# Patient Record
Sex: MALE | Race: OTHER | NOT HISPANIC OR LATINO | URBAN - METROPOLITAN AREA
[De-identification: names, ages, dates, MRNs, and addresses within clinical notes are randomized per-mention and may not be internally consistent; named-entity substitution may affect disease eponyms.]

---

## 2021-05-04 PROBLEM — Z00.00 ENCOUNTER FOR PREVENTIVE HEALTH EXAMINATION: Status: ACTIVE | Noted: 2021-05-04

## 2021-05-05 ENCOUNTER — OUTPATIENT (OUTPATIENT)
Dept: OUTPATIENT SERVICES | Facility: HOSPITAL | Age: 71
LOS: 1 days | Discharge: HOME | End: 2021-05-05
Payer: MEDICARE

## 2021-05-06 ENCOUNTER — OUTPATIENT (OUTPATIENT)
Dept: OUTPATIENT SERVICES | Facility: HOSPITAL | Age: 71
LOS: 1 days | Discharge: HOME | End: 2021-05-06
Payer: MEDICARE

## 2021-05-06 DIAGNOSIS — C09.9 MALIGNANT NEOPLASM OF TONSIL, UNSPECIFIED: ICD-10-CM

## 2021-05-07 ENCOUNTER — NON-APPOINTMENT (OUTPATIENT)
Age: 71
End: 2021-05-07

## 2021-05-07 ENCOUNTER — RESULT REVIEW (OUTPATIENT)
Age: 71
End: 2021-05-07

## 2021-05-07 ENCOUNTER — APPOINTMENT (OUTPATIENT)
Dept: HEMATOLOGY ONCOLOGY | Facility: CLINIC | Age: 71
End: 2021-05-07
Payer: MEDICARE

## 2021-05-07 ENCOUNTER — APPOINTMENT (OUTPATIENT)
Dept: RADIATION ONCOLOGY | Facility: CLINIC | Age: 71
End: 2021-05-07
Payer: MEDICARE

## 2021-05-07 VITALS
HEIGHT: 72 IN | OXYGEN SATURATION: 97 % | SYSTOLIC BLOOD PRESSURE: 136 MMHG | WEIGHT: 170.86 LBS | TEMPERATURE: 97.3 F | BODY MASS INDEX: 23.14 KG/M2 | RESPIRATION RATE: 16 BRPM | DIASTOLIC BLOOD PRESSURE: 85 MMHG | HEART RATE: 81 BPM

## 2021-05-07 VITALS
BODY MASS INDEX: 23.14 KG/M2 | OXYGEN SATURATION: 98 % | HEART RATE: 72 BPM | RESPIRATION RATE: 16 BRPM | HEIGHT: 72 IN | TEMPERATURE: 97.9 F | DIASTOLIC BLOOD PRESSURE: 93 MMHG | SYSTOLIC BLOOD PRESSURE: 159 MMHG | WEIGHT: 170.86 LBS

## 2021-05-07 DIAGNOSIS — Z78.9 OTHER SPECIFIED HEALTH STATUS: ICD-10-CM

## 2021-05-07 DIAGNOSIS — Z83.3 FAMILY HISTORY OF DIABETES MELLITUS: ICD-10-CM

## 2021-05-07 LAB
BASOPHILS # BLD AUTO: 0.02 K/UL — SIGNIFICANT CHANGE UP (ref 0–0.2)
BASOPHILS NFR BLD AUTO: 0.3 % — SIGNIFICANT CHANGE UP (ref 0–2)
EOSINOPHIL # BLD AUTO: 0.08 K/UL — SIGNIFICANT CHANGE UP (ref 0–0.5)
EOSINOPHIL NFR BLD AUTO: 1.4 % — SIGNIFICANT CHANGE UP (ref 0–6)
HCT VFR BLD CALC: 48.7 % — SIGNIFICANT CHANGE UP (ref 39–50)
HGB BLD-MCNC: 15.6 G/DL — SIGNIFICANT CHANGE UP (ref 13–17)
IMM GRANULOCYTES NFR BLD AUTO: 0.3 % — SIGNIFICANT CHANGE UP (ref 0–1.5)
LYMPHOCYTES # BLD AUTO: 1.28 K/UL — SIGNIFICANT CHANGE UP (ref 1–3.3)
LYMPHOCYTES # BLD AUTO: 22.1 % — SIGNIFICANT CHANGE UP (ref 13–44)
MCHC RBC-ENTMCNC: 30.5 PG — SIGNIFICANT CHANGE UP (ref 27–34)
MCHC RBC-ENTMCNC: 32 G/DL — SIGNIFICANT CHANGE UP (ref 32–36)
MCV RBC AUTO: 95.1 FL — SIGNIFICANT CHANGE UP (ref 80–100)
MONOCYTES # BLD AUTO: 0.43 K/UL — SIGNIFICANT CHANGE UP (ref 0–0.9)
MONOCYTES NFR BLD AUTO: 7.4 % — SIGNIFICANT CHANGE UP (ref 2–14)
NEUTROPHILS # BLD AUTO: 3.96 K/UL — SIGNIFICANT CHANGE UP (ref 1.8–7.4)
NEUTROPHILS NFR BLD AUTO: 68.5 % — SIGNIFICANT CHANGE UP (ref 43–77)
NRBC # BLD: 0 /100 WBCS — SIGNIFICANT CHANGE UP (ref 0–0)
PLATELET # BLD AUTO: 241 K/UL — SIGNIFICANT CHANGE UP (ref 150–400)
RBC # BLD: 5.12 M/UL — SIGNIFICANT CHANGE UP (ref 4.2–5.8)
RBC # FLD: 12.5 % — SIGNIFICANT CHANGE UP (ref 10.3–14.5)
WBC # BLD: 5.79 K/UL — SIGNIFICANT CHANGE UP (ref 3.8–10.5)
WBC # FLD AUTO: 5.79 K/UL — SIGNIFICANT CHANGE UP (ref 3.8–10.5)

## 2021-05-07 PROCEDURE — 99072 ADDL SUPL MATRL&STAF TM PHE: CPT

## 2021-05-07 PROCEDURE — 99204 OFFICE O/P NEW MOD 45 MIN: CPT | Mod: 25

## 2021-05-07 PROCEDURE — 99205 OFFICE O/P NEW HI 60 MIN: CPT

## 2021-05-07 PROCEDURE — 77263 THER RADIOLOGY TX PLNG CPLX: CPT

## 2021-05-07 NOTE — VITALS
[Maximal Pain Intensity: 3/10] : 3/10 [Least Pain Intensity: 0/10] : 0/10 [Pain Description/Quality: ___] : Pain description/quality: [unfilled] [Pain Duration: ___] : Pain duration: [unfilled] [Pain Location: ___] : Pain Location: [unfilled] [NoTreatment Scheduled] : no treatment scheduled [90: Able to carry normal activity; minor signs or symptoms of disease.] : 90: Able to carry normal activity; minor signs or symptoms of disease.  [ECOG Performance Status: 1 - Restricted in physically strenuous activity but ambulatory and able to carry out work of a light or sedentary nature] : Performance Status: 1 - Restricted in physically strenuous activity but ambulatory and able to carry out work of a light or sedentary nature, e.g., light house work, office work

## 2021-05-10 LAB
ALBUMIN SERPL ELPH-MCNC: 4.8 G/DL
ALP BLD-CCNC: 53 U/L
ALT SERPL-CCNC: 15 U/L
ANION GAP SERPL CALC-SCNC: 17 MMOL/L
AST SERPL-CCNC: 19 U/L
BILIRUB SERPL-MCNC: 0.6 MG/DL
BUN SERPL-MCNC: 11 MG/DL
CALCIUM SERPL-MCNC: 9.9 MG/DL
CHLORIDE SERPL-SCNC: 105 MMOL/L
CO2 SERPL-SCNC: 21 MMOL/L
CREAT SERPL-MCNC: 0.96 MG/DL
GLUCOSE SERPL-MCNC: 84 MG/DL
HBV CORE IGG+IGM SER QL: REACTIVE
HBV SURFACE AB SER QL: REACTIVE
HBV SURFACE AG SER QL: NONREACTIVE
HCV AB SER QL: NONREACTIVE
HCV S/CO RATIO: 0.94 S/CO
LDH SERPL-CCNC: 153 U/L
POTASSIUM SERPL-SCNC: 4.3 MMOL/L
PROT SERPL-MCNC: 7.6 G/DL
SODIUM SERPL-SCNC: 143 MMOL/L

## 2021-05-10 NOTE — REVIEW OF SYSTEMS
[Negative] : Allergic/Immunologic [FreeTextEntry4] : left tonsil mass [FreeTextEntry8] : h/o right kidney surgery

## 2021-05-10 NOTE — PHYSICAL EXAM
[Normal] : oriented to person, place and time, the affect was normal, the mood was normal and not anxious [de-identified] : L tonsil/BOT lesion

## 2021-05-10 NOTE — HISTORY OF PRESENT ILLNESS
[FreeTextEntry1] : 71 y/o male without significant PMH, newly diagnosed left tonsil SCC presents to discuss radiation treatment. \par \par Initially he presented with left neck pain or discomfort when swallowing. No headache, dizziness, SOB, cough. \par \par Pathology in 3/2021 from Dr Sands (phone [phone]) showed left tonsil SCC. 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 Will order PET scan\par \par Side effects of nasopharyngeal and cervical radiation were reviewed and include but are not limited to mucositis, xerostomia, dysgeusia, odynophagia, dysphagia, radiation dermatitis, radiation necrosis, feeding tube placement, feeding tube dependency, hypothyroidism, and secondary malignancy.

## 2021-05-12 ENCOUNTER — RESULT REVIEW (OUTPATIENT)
Age: 71
End: 2021-05-12

## 2021-05-12 PROCEDURE — 88321 CONSLTJ&REPRT SLD PREP ELSWR: CPT

## 2021-05-13 ENCOUNTER — RESULT REVIEW (OUTPATIENT)
Age: 71
End: 2021-05-13

## 2021-05-13 PROCEDURE — 88321 CONSLTJ&REPRT SLD PREP ELSWR: CPT

## 2021-05-14 LAB
NON-GYNECOLOGICAL CYTOLOGY STUDY: SIGNIFICANT CHANGE UP
SURGICAL PATHOLOGY STUDY: SIGNIFICANT CHANGE UP

## 2021-05-21 ENCOUNTER — NON-APPOINTMENT (OUTPATIENT)
Age: 71
End: 2021-05-21

## 2021-05-24 ENCOUNTER — RESULT REVIEW (OUTPATIENT)
Age: 71
End: 2021-05-24

## 2021-05-27 ENCOUNTER — APPOINTMENT (OUTPATIENT)
Dept: NUCLEAR MEDICINE | Facility: IMAGING CENTER | Age: 71
End: 2021-05-27
Payer: MEDICARE

## 2021-05-27 ENCOUNTER — OUTPATIENT (OUTPATIENT)
Dept: OUTPATIENT SERVICES | Facility: HOSPITAL | Age: 71
LOS: 1 days | End: 2021-05-27
Payer: MEDICARE

## 2021-05-27 DIAGNOSIS — C09.8 MALIGNANT NEOPLASM OF OVERLAPPING SITES OF TONSIL: ICD-10-CM

## 2021-05-27 PROCEDURE — A9552: CPT

## 2021-05-27 PROCEDURE — 78815 PET IMAGE W/CT SKULL-THIGH: CPT

## 2021-05-27 PROCEDURE — 78815 PET IMAGE W/CT SKULL-THIGH: CPT | Mod: 26,PI

## 2021-06-08 ENCOUNTER — APPOINTMENT (OUTPATIENT)
Dept: SPEECH THERAPY | Facility: CLINIC | Age: 71
End: 2021-06-08
Payer: MEDICARE

## 2021-06-08 ENCOUNTER — TRANSCRIPTION ENCOUNTER (OUTPATIENT)
Age: 71
End: 2021-06-08

## 2021-06-08 PROCEDURE — 92610 EVALUATE SWALLOWING FUNCTION: CPT | Mod: GN

## 2021-06-11 ENCOUNTER — NON-APPOINTMENT (OUTPATIENT)
Age: 71
End: 2021-06-11

## 2021-06-11 PROCEDURE — 77338 DESIGN MLC DEVICE FOR IMRT: CPT | Mod: 26

## 2021-06-11 PROCEDURE — 77300 RADIATION THERAPY DOSE PLAN: CPT | Mod: 26

## 2021-06-11 PROCEDURE — 77301 RADIOTHERAPY DOSE PLAN IMRT: CPT | Mod: 26

## 2021-06-15 ENCOUNTER — OUTPATIENT (OUTPATIENT)
Dept: OUTPATIENT SERVICES | Facility: HOSPITAL | Age: 71
LOS: 1 days | Discharge: HOME | End: 2021-06-15

## 2021-06-15 ENCOUNTER — NON-APPOINTMENT (OUTPATIENT)
Age: 71
End: 2021-06-15

## 2021-06-15 DIAGNOSIS — C09.9 MALIGNANT NEOPLASM OF TONSIL, UNSPECIFIED: ICD-10-CM

## 2021-06-16 ENCOUNTER — APPOINTMENT (OUTPATIENT)
Dept: HEMATOLOGY ONCOLOGY | Facility: CLINIC | Age: 71
End: 2021-06-16

## 2021-06-17 ENCOUNTER — NON-APPOINTMENT (OUTPATIENT)
Age: 71
End: 2021-06-17

## 2021-06-18 ENCOUNTER — TRANSCRIPTION ENCOUNTER (OUTPATIENT)
Age: 71
End: 2021-06-18

## 2021-06-20 ENCOUNTER — NON-APPOINTMENT (OUTPATIENT)
Age: 71
End: 2021-06-20

## 2021-06-22 ENCOUNTER — NON-APPOINTMENT (OUTPATIENT)
Age: 71
End: 2021-06-22

## 2021-06-23 ENCOUNTER — NON-APPOINTMENT (OUTPATIENT)
Age: 71
End: 2021-06-23

## 2021-06-25 ENCOUNTER — LABORATORY RESULT (OUTPATIENT)
Age: 71
End: 2021-06-25

## 2021-06-25 ENCOUNTER — RESULT REVIEW (OUTPATIENT)
Age: 71
End: 2021-06-25

## 2021-06-25 ENCOUNTER — APPOINTMENT (OUTPATIENT)
Dept: INFUSION THERAPY | Facility: HOSPITAL | Age: 71
End: 2021-06-25

## 2021-06-25 ENCOUNTER — APPOINTMENT (OUTPATIENT)
Dept: HEMATOLOGY ONCOLOGY | Facility: CLINIC | Age: 71
End: 2021-06-25
Payer: MEDICARE

## 2021-06-25 LAB
BASOPHILS # BLD AUTO: 0.04 K/UL — SIGNIFICANT CHANGE UP (ref 0–0.2)
BASOPHILS NFR BLD AUTO: 0.7 % — SIGNIFICANT CHANGE UP (ref 0–2)
EOSINOPHIL # BLD AUTO: 0.08 K/UL — SIGNIFICANT CHANGE UP (ref 0–0.5)
EOSINOPHIL NFR BLD AUTO: 1.3 % — SIGNIFICANT CHANGE UP (ref 0–6)
HCT VFR BLD CALC: 44.2 % — SIGNIFICANT CHANGE UP (ref 39–50)
HGB BLD-MCNC: 14.6 G/DL — SIGNIFICANT CHANGE UP (ref 13–17)
IMM GRANULOCYTES NFR BLD AUTO: 1.3 % — SIGNIFICANT CHANGE UP (ref 0–1.5)
LYMPHOCYTES # BLD AUTO: 1.2 K/UL — SIGNIFICANT CHANGE UP (ref 1–3.3)
LYMPHOCYTES # BLD AUTO: 20.1 % — SIGNIFICANT CHANGE UP (ref 13–44)
MCHC RBC-ENTMCNC: 31.1 PG — SIGNIFICANT CHANGE UP (ref 27–34)
MCHC RBC-ENTMCNC: 33 G/DL — SIGNIFICANT CHANGE UP (ref 32–36)
MCV RBC AUTO: 94.2 FL — SIGNIFICANT CHANGE UP (ref 80–100)
MONOCYTES # BLD AUTO: 0.43 K/UL — SIGNIFICANT CHANGE UP (ref 0–0.9)
MONOCYTES NFR BLD AUTO: 7.2 % — SIGNIFICANT CHANGE UP (ref 2–14)
NEUTROPHILS # BLD AUTO: 4.15 K/UL — SIGNIFICANT CHANGE UP (ref 1.8–7.4)
NEUTROPHILS NFR BLD AUTO: 69.4 % — SIGNIFICANT CHANGE UP (ref 43–77)
NRBC # BLD: 0 /100 WBCS — SIGNIFICANT CHANGE UP (ref 0–0)
PLATELET # BLD AUTO: 253 K/UL — SIGNIFICANT CHANGE UP (ref 150–400)
RBC # BLD: 4.69 M/UL — SIGNIFICANT CHANGE UP (ref 4.2–5.8)
RBC # FLD: 12.6 % — SIGNIFICANT CHANGE UP (ref 10.3–14.5)
WBC # BLD: 5.98 K/UL — SIGNIFICANT CHANGE UP (ref 3.8–10.5)
WBC # FLD AUTO: 5.98 K/UL — SIGNIFICANT CHANGE UP (ref 3.8–10.5)

## 2021-06-25 PROCEDURE — 99214 OFFICE O/P EST MOD 30 MIN: CPT

## 2021-06-25 PROCEDURE — 77387B: CUSTOM | Mod: 26

## 2021-06-27 DIAGNOSIS — R11.2 NAUSEA WITH VOMITING, UNSPECIFIED: ICD-10-CM

## 2021-06-27 DIAGNOSIS — E86.0 DEHYDRATION: ICD-10-CM

## 2021-06-27 DIAGNOSIS — Z51.11 ENCOUNTER FOR ANTINEOPLASTIC CHEMOTHERAPY: ICD-10-CM

## 2021-06-28 ENCOUNTER — APPOINTMENT (OUTPATIENT)
Dept: INFUSION THERAPY | Facility: HOSPITAL | Age: 71
End: 2021-06-28

## 2021-06-28 ENCOUNTER — LABORATORY RESULT (OUTPATIENT)
Age: 71
End: 2021-06-28

## 2021-06-28 ENCOUNTER — NON-APPOINTMENT (OUTPATIENT)
Age: 71
End: 2021-06-28

## 2021-06-28 PROCEDURE — 77014: CPT | Mod: 26

## 2021-06-29 PROCEDURE — 77387B: CUSTOM | Mod: 26

## 2021-06-30 ENCOUNTER — NON-APPOINTMENT (OUTPATIENT)
Age: 71
End: 2021-06-30

## 2021-06-30 VITALS — WEIGHT: 158 LBS | HEIGHT: 72 IN | RESPIRATION RATE: 18 BRPM | BODY MASS INDEX: 21.4 KG/M2

## 2021-06-30 PROCEDURE — 77387B: CUSTOM | Mod: 26

## 2021-06-30 NOTE — PHYSICAL EXAM
[Normal] : well developed, well nourished, in no acute distress [Sclera] : the sclera and conjunctiva were normal

## 2021-07-01 PROBLEM — C10.9 MALIGNANT NEOPLASM OF OROPHARYNX, UNSPECIFIED: Chronic | Status: ACTIVE | Noted: 2021-06-15

## 2021-07-01 PROCEDURE — 77427 RADIATION TX MANAGEMENT X5: CPT

## 2021-07-01 PROCEDURE — 77387B: CUSTOM | Mod: 26

## 2021-07-02 ENCOUNTER — RESULT REVIEW (OUTPATIENT)
Age: 71
End: 2021-07-02

## 2021-07-02 ENCOUNTER — APPOINTMENT (OUTPATIENT)
Dept: HEMATOLOGY ONCOLOGY | Facility: CLINIC | Age: 71
End: 2021-07-02
Payer: MEDICARE

## 2021-07-02 ENCOUNTER — APPOINTMENT (OUTPATIENT)
Dept: INFUSION THERAPY | Facility: HOSPITAL | Age: 71
End: 2021-07-02

## 2021-07-02 VITALS
BODY MASS INDEX: 20.96 KG/M2 | DIASTOLIC BLOOD PRESSURE: 76 MMHG | RESPIRATION RATE: 18 BRPM | HEIGHT: 72.01 IN | SYSTOLIC BLOOD PRESSURE: 106 MMHG | HEART RATE: 81 BPM | TEMPERATURE: 97.9 F | OXYGEN SATURATION: 98 % | WEIGHT: 154.74 LBS

## 2021-07-02 LAB
BASOPHILS # BLD AUTO: 0.01 K/UL — SIGNIFICANT CHANGE UP (ref 0–0.2)
BASOPHILS NFR BLD AUTO: 0.2 % — SIGNIFICANT CHANGE UP (ref 0–2)
EOSINOPHIL # BLD AUTO: 0.06 K/UL — SIGNIFICANT CHANGE UP (ref 0–0.5)
EOSINOPHIL NFR BLD AUTO: 1 % — SIGNIFICANT CHANGE UP (ref 0–6)
HCT VFR BLD CALC: 44.6 % — SIGNIFICANT CHANGE UP (ref 39–50)
HGB BLD-MCNC: 14.7 G/DL — SIGNIFICANT CHANGE UP (ref 13–17)
IMM GRANULOCYTES NFR BLD AUTO: 0.8 % — SIGNIFICANT CHANGE UP (ref 0–1.5)
LYMPHOCYTES # BLD AUTO: 0.78 K/UL — LOW (ref 1–3.3)
LYMPHOCYTES # BLD AUTO: 12.7 % — LOW (ref 13–44)
MCHC RBC-ENTMCNC: 30.4 PG — SIGNIFICANT CHANGE UP (ref 27–34)
MCHC RBC-ENTMCNC: 33 G/DL — SIGNIFICANT CHANGE UP (ref 32–36)
MCV RBC AUTO: 92.1 FL — SIGNIFICANT CHANGE UP (ref 80–100)
MONOCYTES # BLD AUTO: 0.5 K/UL — SIGNIFICANT CHANGE UP (ref 0–0.9)
MONOCYTES NFR BLD AUTO: 8.2 % — SIGNIFICANT CHANGE UP (ref 2–14)
NEUTROPHILS # BLD AUTO: 4.72 K/UL — SIGNIFICANT CHANGE UP (ref 1.8–7.4)
NEUTROPHILS NFR BLD AUTO: 77.1 % — HIGH (ref 43–77)
NRBC # BLD: 0 /100 WBCS — SIGNIFICANT CHANGE UP (ref 0–0)
PLATELET # BLD AUTO: 257 K/UL — SIGNIFICANT CHANGE UP (ref 150–400)
RBC # BLD: 4.84 M/UL — SIGNIFICANT CHANGE UP (ref 4.2–5.8)
RBC # FLD: 12.3 % — SIGNIFICANT CHANGE UP (ref 10.3–14.5)
WBC # BLD: 6.12 K/UL — SIGNIFICANT CHANGE UP (ref 3.8–10.5)
WBC # FLD AUTO: 6.12 K/UL — SIGNIFICANT CHANGE UP (ref 3.8–10.5)

## 2021-07-02 PROCEDURE — 77387B: CUSTOM | Mod: 26

## 2021-07-02 PROCEDURE — 99214 OFFICE O/P EST MOD 30 MIN: CPT

## 2021-07-06 ENCOUNTER — APPOINTMENT (OUTPATIENT)
Dept: INFUSION THERAPY | Facility: HOSPITAL | Age: 71
End: 2021-07-06

## 2021-07-06 ENCOUNTER — LABORATORY RESULT (OUTPATIENT)
Age: 71
End: 2021-07-06

## 2021-07-06 PROCEDURE — 77014: CPT | Mod: 26

## 2021-07-07 ENCOUNTER — NON-APPOINTMENT (OUTPATIENT)
Age: 71
End: 2021-07-07

## 2021-07-07 PROCEDURE — 77387B: CUSTOM | Mod: 26

## 2021-07-08 PROCEDURE — 77387B: CUSTOM | Mod: 26

## 2021-07-09 ENCOUNTER — APPOINTMENT (OUTPATIENT)
Dept: INFUSION THERAPY | Facility: HOSPITAL | Age: 71
End: 2021-07-09

## 2021-07-09 ENCOUNTER — RESULT REVIEW (OUTPATIENT)
Age: 71
End: 2021-07-09

## 2021-07-09 ENCOUNTER — APPOINTMENT (OUTPATIENT)
Dept: HEMATOLOGY ONCOLOGY | Facility: CLINIC | Age: 71
End: 2021-07-09
Payer: MEDICARE

## 2021-07-09 VITALS
OXYGEN SATURATION: 99 % | RESPIRATION RATE: 14 BRPM | DIASTOLIC BLOOD PRESSURE: 82 MMHG | HEART RATE: 81 BPM | TEMPERATURE: 98 F | WEIGHT: 152.12 LBS | BODY MASS INDEX: 20.65 KG/M2 | SYSTOLIC BLOOD PRESSURE: 118 MMHG

## 2021-07-09 LAB
BASOPHILS # BLD AUTO: 0.02 K/UL — SIGNIFICANT CHANGE UP (ref 0–0.2)
BASOPHILS NFR BLD AUTO: 0.3 % — SIGNIFICANT CHANGE UP (ref 0–2)
EOSINOPHIL # BLD AUTO: 0.04 K/UL — SIGNIFICANT CHANGE UP (ref 0–0.5)
EOSINOPHIL NFR BLD AUTO: 0.6 % — SIGNIFICANT CHANGE UP (ref 0–6)
HCT VFR BLD CALC: 39.6 % — SIGNIFICANT CHANGE UP (ref 39–50)
HGB BLD-MCNC: 13.2 G/DL — SIGNIFICANT CHANGE UP (ref 13–17)
IMM GRANULOCYTES NFR BLD AUTO: 1 % — SIGNIFICANT CHANGE UP (ref 0–1.5)
LYMPHOCYTES # BLD AUTO: 0.63 K/UL — LOW (ref 1–3.3)
LYMPHOCYTES # BLD AUTO: 10.1 % — LOW (ref 13–44)
MCHC RBC-ENTMCNC: 31.1 PG — SIGNIFICANT CHANGE UP (ref 27–34)
MCHC RBC-ENTMCNC: 33.3 G/DL — SIGNIFICANT CHANGE UP (ref 32–36)
MCV RBC AUTO: 93.2 FL — SIGNIFICANT CHANGE UP (ref 80–100)
MONOCYTES # BLD AUTO: 0.52 K/UL — SIGNIFICANT CHANGE UP (ref 0–0.9)
MONOCYTES NFR BLD AUTO: 8.3 % — SIGNIFICANT CHANGE UP (ref 2–14)
NEUTROPHILS # BLD AUTO: 4.98 K/UL — SIGNIFICANT CHANGE UP (ref 1.8–7.4)
NEUTROPHILS NFR BLD AUTO: 79.7 % — HIGH (ref 43–77)
NRBC # BLD: 0 /100 WBCS — SIGNIFICANT CHANGE UP (ref 0–0)
PLATELET # BLD AUTO: 251 K/UL — SIGNIFICANT CHANGE UP (ref 150–400)
RBC # BLD: 4.25 M/UL — SIGNIFICANT CHANGE UP (ref 4.2–5.8)
RBC # FLD: 12.4 % — SIGNIFICANT CHANGE UP (ref 10.3–14.5)
WBC # BLD: 6.25 K/UL — SIGNIFICANT CHANGE UP (ref 3.8–10.5)
WBC # FLD AUTO: 6.25 K/UL — SIGNIFICANT CHANGE UP (ref 3.8–10.5)

## 2021-07-09 PROCEDURE — 77387B: CUSTOM | Mod: 26

## 2021-07-09 PROCEDURE — 99214 OFFICE O/P EST MOD 30 MIN: CPT

## 2021-07-12 PROCEDURE — 77014: CPT | Mod: 26

## 2021-07-13 ENCOUNTER — LABORATORY RESULT (OUTPATIENT)
Age: 71
End: 2021-07-13

## 2021-07-13 ENCOUNTER — APPOINTMENT (OUTPATIENT)
Dept: INFUSION THERAPY | Facility: HOSPITAL | Age: 71
End: 2021-07-13

## 2021-07-13 PROCEDURE — 77387B: CUSTOM | Mod: 26

## 2021-07-13 NOTE — HISTORY OF PRESENT ILLNESS
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 Patient undergoing chemoRT with 70Gy.\par \par Presents for OTV, 4/35 fractions completed. Patient has no complaints today, w/ decrease appetite overall since diagnosis.

## 2021-07-14 ENCOUNTER — NON-APPOINTMENT (OUTPATIENT)
Age: 71
End: 2021-07-14

## 2021-07-14 ENCOUNTER — OUTPATIENT (OUTPATIENT)
Dept: OUTPATIENT SERVICES | Facility: HOSPITAL | Age: 71
LOS: 1 days | Discharge: HOME | End: 2021-07-14

## 2021-07-14 VITALS
RESPIRATION RATE: 17 BRPM | BODY MASS INDEX: 20.74 KG/M2 | WEIGHT: 152.78 LBS | HEART RATE: 78 BPM | DIASTOLIC BLOOD PRESSURE: 85 MMHG | OXYGEN SATURATION: 100 % | SYSTOLIC BLOOD PRESSURE: 177 MMHG

## 2021-07-14 DIAGNOSIS — C09.9 MALIGNANT NEOPLASM OF TONSIL, UNSPECIFIED: ICD-10-CM

## 2021-07-14 PROCEDURE — 77427 RADIATION TX MANAGEMENT X5: CPT

## 2021-07-14 PROCEDURE — 77387B: CUSTOM | Mod: 26

## 2021-07-15 PROCEDURE — 77387B: CUSTOM | Mod: 26

## 2021-07-16 ENCOUNTER — RESULT REVIEW (OUTPATIENT)
Age: 71
End: 2021-07-16

## 2021-07-16 ENCOUNTER — APPOINTMENT (OUTPATIENT)
Dept: HEMATOLOGY ONCOLOGY | Facility: CLINIC | Age: 71
End: 2021-07-16
Payer: MEDICARE

## 2021-07-16 ENCOUNTER — APPOINTMENT (OUTPATIENT)
Dept: INFUSION THERAPY | Facility: HOSPITAL | Age: 71
End: 2021-07-16

## 2021-07-16 DIAGNOSIS — B37.0 CANDIDAL STOMATITIS: ICD-10-CM

## 2021-07-16 LAB
BASOPHILS # BLD AUTO: 0.03 K/UL — SIGNIFICANT CHANGE UP (ref 0–0.2)
BASOPHILS NFR BLD AUTO: 0.7 % — SIGNIFICANT CHANGE UP (ref 0–2)
EOSINOPHIL # BLD AUTO: 0.05 K/UL — SIGNIFICANT CHANGE UP (ref 0–0.5)
EOSINOPHIL NFR BLD AUTO: 1.2 % — SIGNIFICANT CHANGE UP (ref 0–6)
HCT VFR BLD CALC: 35 % — LOW (ref 39–50)
HGB BLD-MCNC: 11.9 G/DL — LOW (ref 13–17)
IMM GRANULOCYTES NFR BLD AUTO: 1.7 % — HIGH (ref 0–1.5)
LYMPHOCYTES # BLD AUTO: 0.46 K/UL — LOW (ref 1–3.3)
LYMPHOCYTES # BLD AUTO: 11.2 % — LOW (ref 13–44)
MCHC RBC-ENTMCNC: 31.5 PG — SIGNIFICANT CHANGE UP (ref 27–34)
MCHC RBC-ENTMCNC: 34 G/DL — SIGNIFICANT CHANGE UP (ref 32–36)
MCV RBC AUTO: 92.6 FL — SIGNIFICANT CHANGE UP (ref 80–100)
MONOCYTES # BLD AUTO: 0.28 K/UL — SIGNIFICANT CHANGE UP (ref 0–0.9)
MONOCYTES NFR BLD AUTO: 6.8 % — SIGNIFICANT CHANGE UP (ref 2–14)
NEUTROPHILS # BLD AUTO: 3.21 K/UL — SIGNIFICANT CHANGE UP (ref 1.8–7.4)
NEUTROPHILS NFR BLD AUTO: 78.4 % — HIGH (ref 43–77)
NRBC # BLD: 0 /100 WBCS — SIGNIFICANT CHANGE UP (ref 0–0)
PLATELET # BLD AUTO: 215 K/UL — SIGNIFICANT CHANGE UP (ref 150–400)
RBC # BLD: 3.78 M/UL — LOW (ref 4.2–5.8)
RBC # FLD: 12.5 % — SIGNIFICANT CHANGE UP (ref 10.3–14.5)
WBC # BLD: 4.1 K/UL — SIGNIFICANT CHANGE UP (ref 3.8–10.5)
WBC # FLD AUTO: 4.1 K/UL — SIGNIFICANT CHANGE UP (ref 3.8–10.5)

## 2021-07-16 PROCEDURE — 77387B: CUSTOM | Mod: 26

## 2021-07-16 PROCEDURE — 99215 OFFICE O/P EST HI 40 MIN: CPT

## 2021-07-18 DIAGNOSIS — E86.0 DEHYDRATION: ICD-10-CM

## 2021-07-18 DIAGNOSIS — Z51.11 ENCOUNTER FOR ANTINEOPLASTIC CHEMOTHERAPY: ICD-10-CM

## 2021-07-18 DIAGNOSIS — R11.2 NAUSEA WITH VOMITING, UNSPECIFIED: ICD-10-CM

## 2021-07-19 PROCEDURE — 77014: CPT | Mod: 26

## 2021-07-20 ENCOUNTER — APPOINTMENT (OUTPATIENT)
Dept: INFUSION THERAPY | Facility: HOSPITAL | Age: 71
End: 2021-07-20

## 2021-07-20 ENCOUNTER — LABORATORY RESULT (OUTPATIENT)
Age: 71
End: 2021-07-20

## 2021-07-20 PROCEDURE — 77387B: CUSTOM | Mod: 26

## 2021-07-21 ENCOUNTER — NON-APPOINTMENT (OUTPATIENT)
Age: 71
End: 2021-07-21

## 2021-07-21 VITALS — WEIGHT: 148 LBS | RESPIRATION RATE: 18 BRPM | BODY MASS INDEX: 20.72 KG/M2 | HEIGHT: 71 IN

## 2021-07-21 PROCEDURE — 77387B: CUSTOM | Mod: 26

## 2021-07-21 PROCEDURE — 77427 RADIATION TX MANAGEMENT X5: CPT

## 2021-07-21 NOTE — REVIEW OF SYSTEMS
[Dysphagia: Grade 0] : Dysphagia: Grade 0 [Tinnitus - Grade 0] : Tinnitus - Grade 0 [Blurred Vision: Grade 0] : Blurred Vision: Grade 0 [Mucositis Oral: Grade 0] : Mucositis Oral: Grade 0  [Xerostomia: Grade 1 - Symptomatic (e.g., dry or thick saliva) without significant dietary alteration; unstimulated saliva flow >0.2 ml/min] : Xerostomia: Grade 1 - Symptomatic (e.g., dry or thick saliva) without significant dietary alteration; unstimulated saliva flow >0.2 ml/min [Oral Pain: Grade 0] : Oral Pain: Grade 0 [Salivary duct inflammation: Grade 1 - Slightly thickened saliva; slightly altered taste (e.g., metallic)] : Salivary duct inflammation: Grade 1 - Slightly thickened saliva; slightly altered taste (e.g., metallic) [Dysgeusia: Grade 2 - Altered taste with change in diet (e.g., oral supplements); noxious or unpleasant taste; loss of taste] : Dysgeusia: Grade 2 - Altered taste with change in diet (e.g., oral supplements); noxious or unpleasant taste; loss of taste [Hoarseness: Grade 0] : Hoarseness: Grade 0 [Alopecia: Grade 0] : Alopecia: Grade 0 [Pruritus: Grade 0] : Pruritus: Grade 0 [Skin Atrophy: Grade 0] : Skin Atrophy: Grade 0 [Skin Hyperpigmentation: Grade 0] : Skin Hyperpigmentation: Grade 0 [Skin Induration: Grade 0] : Skin Induration: Grade 0 [Dermatitis Radiation: Grade 0] : Dermatitis Radiation: Grade 0

## 2021-07-22 PROCEDURE — 77387B: CUSTOM | Mod: 26

## 2021-07-23 ENCOUNTER — APPOINTMENT (OUTPATIENT)
Dept: HEMATOLOGY ONCOLOGY | Facility: CLINIC | Age: 71
End: 2021-07-23
Payer: MEDICARE

## 2021-07-23 ENCOUNTER — RESULT REVIEW (OUTPATIENT)
Age: 71
End: 2021-07-23

## 2021-07-23 ENCOUNTER — APPOINTMENT (OUTPATIENT)
Dept: INFUSION THERAPY | Facility: HOSPITAL | Age: 71
End: 2021-07-23

## 2021-07-23 VITALS
HEIGHT: 70.98 IN | TEMPERATURE: 97.7 F | SYSTOLIC BLOOD PRESSURE: 113 MMHG | RESPIRATION RATE: 17 BRPM | OXYGEN SATURATION: 96 % | BODY MASS INDEX: 20.52 KG/M2 | WEIGHT: 146.61 LBS | DIASTOLIC BLOOD PRESSURE: 82 MMHG | HEART RATE: 108 BPM

## 2021-07-23 LAB
BASOPHILS # BLD AUTO: 0 K/UL — SIGNIFICANT CHANGE UP (ref 0–0.2)
BASOPHILS NFR BLD AUTO: 0 % — SIGNIFICANT CHANGE UP (ref 0–2)
EOSINOPHIL # BLD AUTO: 0.07 K/UL — SIGNIFICANT CHANGE UP (ref 0–0.5)
EOSINOPHIL NFR BLD AUTO: 2 % — SIGNIFICANT CHANGE UP (ref 0–6)
HCT VFR BLD CALC: 35.1 % — LOW (ref 39–50)
HGB BLD-MCNC: 12 G/DL — LOW (ref 13–17)
LYMPHOCYTES # BLD AUTO: 0.42 K/UL — LOW (ref 1–3.3)
LYMPHOCYTES # BLD AUTO: 12 % — LOW (ref 13–44)
MCHC RBC-ENTMCNC: 31.6 PG — SIGNIFICANT CHANGE UP (ref 27–34)
MCHC RBC-ENTMCNC: 34.2 G/DL — SIGNIFICANT CHANGE UP (ref 32–36)
MCV RBC AUTO: 92.4 FL — SIGNIFICANT CHANGE UP (ref 80–100)
MONOCYTES # BLD AUTO: 0.14 K/UL — SIGNIFICANT CHANGE UP (ref 0–0.9)
MONOCYTES NFR BLD AUTO: 4 % — SIGNIFICANT CHANGE UP (ref 2–14)
NEUTROPHILS # BLD AUTO: 2.85 K/UL — SIGNIFICANT CHANGE UP (ref 1.8–7.4)
NEUTROPHILS NFR BLD AUTO: 82 % — HIGH (ref 43–77)
NRBC # BLD: 0 /100 — SIGNIFICANT CHANGE UP (ref 0–0)
NRBC # BLD: SIGNIFICANT CHANGE UP /100 WBCS (ref 0–0)
PLAT MORPH BLD: NORMAL — SIGNIFICANT CHANGE UP
PLATELET # BLD AUTO: 156 K/UL — SIGNIFICANT CHANGE UP (ref 150–400)
RBC # BLD: 3.8 M/UL — LOW (ref 4.2–5.8)
RBC # FLD: 12.9 % — SIGNIFICANT CHANGE UP (ref 10.3–14.5)
RBC BLD AUTO: SIGNIFICANT CHANGE UP
WBC # BLD: 3.47 K/UL — LOW (ref 3.8–10.5)
WBC # FLD AUTO: 3.47 K/UL — LOW (ref 3.8–10.5)

## 2021-07-23 PROCEDURE — 77387B: CUSTOM | Mod: 26

## 2021-07-23 PROCEDURE — 99214 OFFICE O/P EST MOD 30 MIN: CPT

## 2021-07-26 PROCEDURE — 77014: CPT | Mod: 26

## 2021-07-27 ENCOUNTER — APPOINTMENT (OUTPATIENT)
Dept: INFUSION THERAPY | Facility: HOSPITAL | Age: 71
End: 2021-07-27

## 2021-07-27 ENCOUNTER — LABORATORY RESULT (OUTPATIENT)
Age: 71
End: 2021-07-27

## 2021-07-27 PROCEDURE — 77387B: CUSTOM | Mod: 26

## 2021-07-28 ENCOUNTER — NON-APPOINTMENT (OUTPATIENT)
Age: 71
End: 2021-07-28

## 2021-07-28 VITALS
OXYGEN SATURATION: 99 % | BODY MASS INDEX: 20.07 KG/M2 | DIASTOLIC BLOOD PRESSURE: 74 MMHG | RESPIRATION RATE: 18 BRPM | WEIGHT: 143.85 LBS | SYSTOLIC BLOOD PRESSURE: 113 MMHG | HEART RATE: 82 BPM | TEMPERATURE: 98.24 F

## 2021-07-28 PROCEDURE — 77387B: CUSTOM | Mod: 26

## 2021-07-28 PROCEDURE — 77427 RADIATION TX MANAGEMENT X5: CPT

## 2021-07-28 NOTE — HISTORY OF PRESENT ILLNESS
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 Patient undergoing chemoRT with 70Gy.\par \par Presents for OTV, 23/35 fractions completed. Patient notes some throat discomfort and loss of taste. No appetite, but will force to eat as much as possible.. Taking 1 Nutriment but will increase as needed. On chemo and blood test okay. 3 lb weight loss this week. Followed by nutritionist.

## 2021-07-28 NOTE — REVIEW OF SYSTEMS
[Dysphagia: Grade 1 - Symptomatic, able to eat regular diet] : Dysphagia: Grade 1 - Symptomatic, able to eat regular diet [Fatigue: Grade 1 - Fatigue relieved by rest] : Fatigue: Grade 1 - Fatigue relieved by rest [Mucositis Oral: Grade 0] : Mucositis Oral: Grade 0  [Xerostomia: Grade 1 - Symptomatic (e.g., dry or thick saliva) without significant dietary alteration; unstimulated saliva flow >0.2 ml/min] : Xerostomia: Grade 1 - Symptomatic (e.g., dry or thick saliva) without significant dietary alteration; unstimulated saliva flow >0.2 ml/min [Oral Pain: Grade 0] : Oral Pain: Grade 0 [Dysgeusia: Grade 2 - Altered taste with change in diet (e.g., oral supplements); noxious or unpleasant taste; loss of taste] : Dysgeusia: Grade 2 - Altered taste with change in diet (e.g., oral supplements); noxious or unpleasant taste; loss of taste [Skin Hyperpigmentation: Grade 1 - Hyperpigmentation covering <10% BSA; no psychosocial impact] : Skin Hyperpigmentation: Grade 1 - Hyperpigmentation covering <10% BSA; no psychosocial impact [Dermatitis Radiation: Grade 1 - Faint erythema or dry desquamation] : Dermatitis Radiation: Grade 1 - Faint erythema or dry desquamation

## 2021-07-28 NOTE — DISEASE MANAGEMENT
[Clinical] : TNM Stage: c [I] : I [TTNM] : 2 [NTNM] : 1 [MTNM] : 0 [de-identified] : 8715 [de-identified] : 70 Gy [de-identified] : oropharynx / neck

## 2021-07-29 PROCEDURE — 77387B: CUSTOM | Mod: 26

## 2021-07-30 ENCOUNTER — APPOINTMENT (OUTPATIENT)
Dept: INFUSION THERAPY | Facility: HOSPITAL | Age: 71
End: 2021-07-30

## 2021-07-30 ENCOUNTER — RESULT REVIEW (OUTPATIENT)
Age: 71
End: 2021-07-30

## 2021-07-30 ENCOUNTER — APPOINTMENT (OUTPATIENT)
Dept: HEMATOLOGY ONCOLOGY | Facility: CLINIC | Age: 71
End: 2021-07-30
Payer: MEDICARE

## 2021-07-30 ENCOUNTER — LABORATORY RESULT (OUTPATIENT)
Age: 71
End: 2021-07-30

## 2021-07-30 LAB
BASOPHILS # BLD AUTO: 0.01 K/UL — SIGNIFICANT CHANGE UP (ref 0–0.2)
BASOPHILS NFR BLD AUTO: 0.3 % — SIGNIFICANT CHANGE UP (ref 0–2)
EOSINOPHIL # BLD AUTO: 0.06 K/UL — SIGNIFICANT CHANGE UP (ref 0–0.5)
EOSINOPHIL NFR BLD AUTO: 1.7 % — SIGNIFICANT CHANGE UP (ref 0–6)
HCT VFR BLD CALC: 33.3 % — LOW (ref 39–50)
HGB BLD-MCNC: 11.2 G/DL — LOW (ref 13–17)
IMM GRANULOCYTES NFR BLD AUTO: 2.6 % — HIGH (ref 0–1.5)
LYMPHOCYTES # BLD AUTO: 0.37 K/UL — LOW (ref 1–3.3)
LYMPHOCYTES # BLD AUTO: 10.8 % — LOW (ref 13–44)
MCHC RBC-ENTMCNC: 31.3 PG — SIGNIFICANT CHANGE UP (ref 27–34)
MCHC RBC-ENTMCNC: 33.6 G/DL — SIGNIFICANT CHANGE UP (ref 32–36)
MCV RBC AUTO: 93 FL — SIGNIFICANT CHANGE UP (ref 80–100)
MONOCYTES # BLD AUTO: 0.22 K/UL — SIGNIFICANT CHANGE UP (ref 0–0.9)
MONOCYTES NFR BLD AUTO: 6.4 % — SIGNIFICANT CHANGE UP (ref 2–14)
NEUTROPHILS # BLD AUTO: 2.68 K/UL — SIGNIFICANT CHANGE UP (ref 1.8–7.4)
NEUTROPHILS NFR BLD AUTO: 78.2 % — HIGH (ref 43–77)
NRBC # BLD: 0 /100 WBCS — SIGNIFICANT CHANGE UP (ref 0–0)
PLATELET # BLD AUTO: 115 K/UL — LOW (ref 150–400)
RBC # BLD: 3.58 M/UL — LOW (ref 4.2–5.8)
RBC # FLD: 13.2 % — SIGNIFICANT CHANGE UP (ref 10.3–14.5)
WBC # BLD: 3.43 K/UL — LOW (ref 3.8–10.5)
WBC # FLD AUTO: 3.43 K/UL — LOW (ref 3.8–10.5)

## 2021-07-30 PROCEDURE — 99214 OFFICE O/P EST MOD 30 MIN: CPT

## 2021-07-30 PROCEDURE — 77387B: CUSTOM | Mod: 26

## 2021-07-30 NOTE — DISEASE MANAGEMENT
[Clinical] : TNM Stage: c [I] : I [TTNM] : 2 [NTNM] : 1 [MTNM] : 0 [de-identified] : 70 Gy [de-identified] : oropharynx / neck

## 2021-07-30 NOTE — HISTORY OF PRESENT ILLNESS
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 Patient undergoing chemoRT with 70Gy.\par \par Presents for OTV, 18/35 fractions completed. Patient notes some throat discomfort and loss of taste. No appetite, but will force to eat as much as possible.. Taking 1 Nutriment but will increase as needed. On chemo and blood test okay.

## 2021-07-30 NOTE — PHYSICAL EXAM
[Normal] : well developed, well nourished, in no acute distress [Sclera] : the sclera and conjunctiva were normal [de-identified] : no oral lesions noted

## 2021-07-30 NOTE — HISTORY OF PRESENT ILLNESS
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 Patient undergoing chemoRT with 70Gy.\par \par Presents for OTV, 13/35 fractions completed. Patient notes some throat discomfort and loss of taste. No appetite, but will force to eat as much as possible. He is lost 2 pounds. Taking 1 Nutriment but will increase as needed.

## 2021-08-02 PROCEDURE — 77014: CPT | Mod: 26

## 2021-08-03 ENCOUNTER — LABORATORY RESULT (OUTPATIENT)
Age: 71
End: 2021-08-03

## 2021-08-03 ENCOUNTER — APPOINTMENT (OUTPATIENT)
Dept: INFUSION THERAPY | Facility: HOSPITAL | Age: 71
End: 2021-08-03

## 2021-08-03 PROCEDURE — 77387B: CUSTOM | Mod: 26

## 2021-08-04 ENCOUNTER — NON-APPOINTMENT (OUTPATIENT)
Age: 71
End: 2021-08-04

## 2021-08-04 VITALS
WEIGHT: 142.64 LBS | RESPIRATION RATE: 16 BRPM | TEMPERATURE: 98.06 F | OXYGEN SATURATION: 99 % | DIASTOLIC BLOOD PRESSURE: 81 MMHG | HEART RATE: 102 BPM | SYSTOLIC BLOOD PRESSURE: 114 MMHG | BODY MASS INDEX: 19.9 KG/M2

## 2021-08-04 PROCEDURE — 77427 RADIATION TX MANAGEMENT X5: CPT

## 2021-08-04 PROCEDURE — 77387B: CUSTOM | Mod: 26

## 2021-08-04 NOTE — HISTORY OF PRESENT ILLNESS
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 Patient undergoing chemoRT with 70Gy.\par \par Presents for OTV, 28/35 fractions completed. Patient notes increased  throat discomfort when swallowing. No appetite, but will force to eat as much as possible.. Taking 1 Nutriment but will increase as needed. On chemo and blood test okay. 1 lb weight loss this week. Followed by nutritionist.

## 2021-08-04 NOTE — VITALS
[Least Pain Intensity: 0/10] : 0/10 [80: Normal activity with effort; some signs or symptoms of disease.] : 80: Normal activity with effort; some signs or symptoms of disease.  [ECOG Performance Status: 1 - Restricted in physically strenuous activity but ambulatory and able to carry out work of a light or sedentary nature] : Performance Status: 1 - Restricted in physically strenuous activity but ambulatory and able to carry out work of a light or sedentary nature, e.g., light house work, office work [Maximal Pain Intensity: 6/10] : 6/10 [Pain Description/Quality: ___] : Pain description/quality: [unfilled] [Pain Duration: ___] : Pain duration: [unfilled] [Pain Location: ___] : Pain Location: [unfilled] [Pain Interferes with ADLs] : Pain interferes with activities of daily living. [Adjuvant (neuroleptics)] : adjuvant (neuroleptics)

## 2021-08-05 PROCEDURE — 77387B: CUSTOM | Mod: 26

## 2021-08-06 ENCOUNTER — RESULT REVIEW (OUTPATIENT)
Age: 71
End: 2021-08-06

## 2021-08-06 ENCOUNTER — APPOINTMENT (OUTPATIENT)
Dept: INFUSION THERAPY | Facility: HOSPITAL | Age: 71
End: 2021-08-06

## 2021-08-06 ENCOUNTER — LABORATORY RESULT (OUTPATIENT)
Age: 71
End: 2021-08-06

## 2021-08-06 ENCOUNTER — APPOINTMENT (OUTPATIENT)
Dept: HEMATOLOGY ONCOLOGY | Facility: CLINIC | Age: 71
End: 2021-08-06
Payer: MEDICARE

## 2021-08-06 LAB
BASOPHILS # BLD AUTO: 0.02 K/UL — SIGNIFICANT CHANGE UP (ref 0–0.2)
BASOPHILS NFR BLD AUTO: 1 % — SIGNIFICANT CHANGE UP (ref 0–2)
BLASTS # FLD: 1 % — HIGH (ref 0–0)
EOSINOPHIL # BLD AUTO: 0.02 K/UL — SIGNIFICANT CHANGE UP (ref 0–0.5)
EOSINOPHIL NFR BLD AUTO: 1 % — SIGNIFICANT CHANGE UP (ref 0–6)
HCT VFR BLD CALC: 31.1 % — LOW (ref 39–50)
HGB BLD-MCNC: 10.1 G/DL — LOW (ref 13–17)
LYMPHOCYTES # BLD AUTO: 0.28 K/UL — LOW (ref 1–3.3)
LYMPHOCYTES # BLD AUTO: 18 % — SIGNIFICANT CHANGE UP (ref 13–44)
MCHC RBC-ENTMCNC: 31.3 PG — SIGNIFICANT CHANGE UP (ref 27–34)
MCHC RBC-ENTMCNC: 32.5 G/DL — SIGNIFICANT CHANGE UP (ref 32–36)
MCV RBC AUTO: 96.3 FL — SIGNIFICANT CHANGE UP (ref 80–100)
METAMYELOCYTES # FLD: 1 % — HIGH (ref 0–0)
MONOCYTES # BLD AUTO: 0.12 K/UL — SIGNIFICANT CHANGE UP (ref 0–0.9)
MONOCYTES NFR BLD AUTO: 8 % — SIGNIFICANT CHANGE UP (ref 2–14)
NEUTROPHILS # BLD AUTO: 1.08 K/UL — LOW (ref 1.8–7.4)
NEUTROPHILS NFR BLD AUTO: 70 % — SIGNIFICANT CHANGE UP (ref 43–77)
NRBC # BLD: 0 /100 — SIGNIFICANT CHANGE UP (ref 0–0)
NRBC # BLD: SIGNIFICANT CHANGE UP /100 WBCS (ref 0–0)
PLAT MORPH BLD: NORMAL — SIGNIFICANT CHANGE UP
PLATELET # BLD AUTO: 92 K/UL — LOW (ref 150–400)
RBC # BLD: 3.23 M/UL — LOW (ref 4.2–5.8)
RBC # FLD: 13.4 % — SIGNIFICANT CHANGE UP (ref 10.3–14.5)
RBC BLD AUTO: SIGNIFICANT CHANGE UP
WBC # BLD: 1.54 K/UL — LOW (ref 3.8–10.5)
WBC # FLD AUTO: 1.54 K/UL — LOW (ref 3.8–10.5)

## 2021-08-06 PROCEDURE — 77387B: CUSTOM | Mod: 26

## 2021-08-06 PROCEDURE — 99214 OFFICE O/P EST MOD 30 MIN: CPT

## 2021-08-10 ENCOUNTER — LABORATORY RESULT (OUTPATIENT)
Age: 71
End: 2021-08-10

## 2021-08-10 ENCOUNTER — APPOINTMENT (OUTPATIENT)
Dept: INFUSION THERAPY | Facility: HOSPITAL | Age: 71
End: 2021-08-10

## 2021-08-11 ENCOUNTER — NON-APPOINTMENT (OUTPATIENT)
Age: 71
End: 2021-08-11

## 2021-08-11 VITALS
BODY MASS INDEX: 19.47 KG/M2 | HEIGHT: 70 IN | DIASTOLIC BLOOD PRESSURE: 79 MMHG | WEIGHT: 136.03 LBS | TEMPERATURE: 97.88 F | RESPIRATION RATE: 16 BRPM | HEART RATE: 121 BPM | OXYGEN SATURATION: 97 % | SYSTOLIC BLOOD PRESSURE: 116 MMHG

## 2021-08-11 PROCEDURE — 77387B: CUSTOM | Mod: 26

## 2021-08-11 NOTE — HISTORY OF PRESENT ILLNESS
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 Patient undergoing chemoRT with 70Gy.\par \par 8/11/21: Presents here for an OTV. Missed treatments. Completed 31/35. Patient states he has dysphagia, thick secretions, and pain to neck. No appetite but has seen nutritionist today and will try new supplements. States he has hydration this friday.  Pain management meds prescribed. Will follow up with patient everyday during treatment. \par \par 8/4/21: Presents for OTV, 28/35 fractions completed. Patient notes increased  throat discomfort when swallowing. No appetite, but will force to eat as much as possible.. Taking 1 Nutriment but will increase as needed. On chemo and blood test okay. 1 lb weight loss this week. Followed by nutritionist.

## 2021-08-11 NOTE — DISEASE MANAGEMENT
[Clinical] : TNM Stage: c [I] : I [TTNM] : 2 [NTNM] : 1 [MTNM] : 0 [de-identified] : 3730 cGy [de-identified] : 70 Gy [de-identified] : oropharynx / neck

## 2021-08-11 NOTE — VITALS
[Maximal Pain Intensity: 6/10] : 6/10 [Least Pain Intensity: 0/10] : 0/10 [Pain Description/Quality: ___] : Pain description/quality: [unfilled] [Pain Duration: ___] : Pain duration: [unfilled] [Pain Location: ___] : Pain Location: [unfilled] [Pain Interferes with ADLs] : Pain interferes with activities of daily living. [Adjuvant (neuroleptics)] : adjuvant (neuroleptics) [80: Normal activity with effort; some signs or symptoms of disease.] : 80: Normal activity with effort; some signs or symptoms of disease.  [ECOG Performance Status: 1 - Restricted in physically strenuous activity but ambulatory and able to carry out work of a light or sedentary nature] : Performance Status: 1 - Restricted in physically strenuous activity but ambulatory and able to carry out work of a light or sedentary nature, e.g., light house work, office work

## 2021-08-11 NOTE — REVIEW OF SYSTEMS
[Dysphagia: Grade 1 - Symptomatic, able to eat regular diet] : Dysphagia: Grade 1 - Symptomatic, able to eat regular diet [Fatigue: Grade 1 - Fatigue relieved by rest] : Fatigue: Grade 1 - Fatigue relieved by rest [Mucositis Oral: Grade 0] : Mucositis Oral: Grade 0  [Xerostomia: Grade 1 - Symptomatic (e.g., dry or thick saliva) without significant dietary alteration; unstimulated saliva flow >0.2 ml/min] : Xerostomia: Grade 1 - Symptomatic (e.g., dry or thick saliva) without significant dietary alteration; unstimulated saliva flow >0.2 ml/min [Dysgeusia: Grade 2 - Altered taste with change in diet (e.g., oral supplements); noxious or unpleasant taste; loss of taste] : Dysgeusia: Grade 2 - Altered taste with change in diet (e.g., oral supplements); noxious or unpleasant taste; loss of taste [Skin Hyperpigmentation: Grade 1 - Hyperpigmentation covering <10% BSA; no psychosocial impact] : Skin Hyperpigmentation: Grade 1 - Hyperpigmentation covering <10% BSA; no psychosocial impact [Dermatitis Radiation: Grade 1 - Faint erythema or dry desquamation] : Dermatitis Radiation: Grade 1 - Faint erythema or dry desquamation [Oral Pain: Grade 2 - Moderate pain; limiting instrumental ADL] : Oral Pain: Grade 2 - Moderate pain; limiting instrumental ADL [Salivary duct inflammation: Grade 1 - Slightly thickened saliva; slightly altered taste (e.g., metallic)] : Salivary duct inflammation: Grade 1 - Slightly thickened saliva; slightly altered taste (e.g., metallic)

## 2021-08-12 ENCOUNTER — NON-APPOINTMENT (OUTPATIENT)
Age: 71
End: 2021-08-12

## 2021-08-12 PROCEDURE — 77387B: CUSTOM | Mod: 26

## 2021-08-13 ENCOUNTER — RESULT REVIEW (OUTPATIENT)
Age: 71
End: 2021-08-13

## 2021-08-13 ENCOUNTER — INPATIENT (INPATIENT)
Facility: HOSPITAL | Age: 71
LOS: 7 days | Discharge: HOME HEALTH | End: 2021-08-21
Attending: HOSPITALIST | Admitting: HOSPITALIST
Payer: MEDICARE

## 2021-08-13 ENCOUNTER — LABORATORY RESULT (OUTPATIENT)
Age: 71
End: 2021-08-13

## 2021-08-13 ENCOUNTER — APPOINTMENT (OUTPATIENT)
Dept: INFUSION THERAPY | Facility: HOSPITAL | Age: 71
End: 2021-08-13

## 2021-08-13 ENCOUNTER — APPOINTMENT (OUTPATIENT)
Dept: HEMATOLOGY ONCOLOGY | Facility: CLINIC | Age: 71
End: 2021-08-13
Payer: MEDICARE

## 2021-08-13 VITALS
SYSTOLIC BLOOD PRESSURE: 126 MMHG | RESPIRATION RATE: 22 BRPM | TEMPERATURE: 99 F | OXYGEN SATURATION: 98 % | HEART RATE: 105 BPM | HEIGHT: 72 IN | DIASTOLIC BLOOD PRESSURE: 79 MMHG

## 2021-08-13 VITALS
DIASTOLIC BLOOD PRESSURE: 76 MMHG | HEART RATE: 67 BPM | TEMPERATURE: 97.6 F | RESPIRATION RATE: 14 BRPM | OXYGEN SATURATION: 96 % | SYSTOLIC BLOOD PRESSURE: 113 MMHG | WEIGHT: 128.97 LBS | BODY MASS INDEX: 18.51 KG/M2

## 2021-08-13 DIAGNOSIS — K12.33 ORAL MUCOSITIS (ULCERATIVE) DUE TO RADIATION: ICD-10-CM

## 2021-08-13 DIAGNOSIS — E44.1 MILD PROTEIN-CALORIE MALNUTRITION: ICD-10-CM

## 2021-08-13 DIAGNOSIS — R07.0 PAIN IN THROAT: ICD-10-CM

## 2021-08-13 DIAGNOSIS — C14.0 MALIGNANT NEOPLASM OF PHARYNX, UNSPECIFIED: ICD-10-CM

## 2021-08-13 DIAGNOSIS — R13.10 DYSPHAGIA, UNSPECIFIED: ICD-10-CM

## 2021-08-13 LAB
ALBUMIN SERPL ELPH-MCNC: 4.2 G/DL — SIGNIFICANT CHANGE UP (ref 3.3–5)
ALP SERPL-CCNC: 54 U/L — SIGNIFICANT CHANGE UP (ref 40–120)
ALT FLD-CCNC: 13 U/L — SIGNIFICANT CHANGE UP (ref 4–41)
ANION GAP SERPL CALC-SCNC: 17 MMOL/L — HIGH (ref 7–14)
AST SERPL-CCNC: 14 U/L — SIGNIFICANT CHANGE UP (ref 4–40)
BASOPHILS # BLD AUTO: 0.01 K/UL — SIGNIFICANT CHANGE UP (ref 0–0.2)
BASOPHILS # BLD AUTO: 0.01 K/UL — SIGNIFICANT CHANGE UP (ref 0–0.2)
BASOPHILS NFR BLD AUTO: 0.7 % — SIGNIFICANT CHANGE UP (ref 0–2)
BASOPHILS NFR BLD AUTO: 1 % — SIGNIFICANT CHANGE UP (ref 0–2)
BILIRUB SERPL-MCNC: 0.6 MG/DL — SIGNIFICANT CHANGE UP (ref 0.2–1.2)
BLOOD GAS VENOUS COMPREHENSIVE RESULT: SIGNIFICANT CHANGE UP
BUN SERPL-MCNC: 40 MG/DL — HIGH (ref 7–23)
CALCIUM SERPL-MCNC: 10.1 MG/DL — SIGNIFICANT CHANGE UP (ref 8.4–10.5)
CHLORIDE SERPL-SCNC: 107 MMOL/L — SIGNIFICANT CHANGE UP (ref 98–107)
CO2 SERPL-SCNC: 24 MMOL/L — SIGNIFICANT CHANGE UP (ref 22–31)
CREAT SERPL-MCNC: 1.76 MG/DL — HIGH (ref 0.5–1.3)
EOSINOPHIL # BLD AUTO: 0 K/UL — SIGNIFICANT CHANGE UP (ref 0–0.5)
EOSINOPHIL # BLD AUTO: 0 K/UL — SIGNIFICANT CHANGE UP (ref 0–0.5)
EOSINOPHIL NFR BLD AUTO: 0 % — SIGNIFICANT CHANGE UP (ref 0–6)
EOSINOPHIL NFR BLD AUTO: 0 % — SIGNIFICANT CHANGE UP (ref 0–6)
GLUCOSE SERPL-MCNC: 105 MG/DL — HIGH (ref 70–99)
HCT VFR BLD CALC: 26.2 % — LOW (ref 39–50)
HCT VFR BLD CALC: 26.5 % — LOW (ref 39–50)
HGB BLD-MCNC: 8.5 G/DL — LOW (ref 13–17)
HGB BLD-MCNC: 8.9 G/DL — LOW (ref 13–17)
IANC: 0.93 K/UL — LOW (ref 1.5–8.5)
IMM GRANULOCYTES NFR BLD AUTO: 1.5 % — SIGNIFICANT CHANGE UP (ref 0–1.5)
LYMPHOCYTES # BLD AUTO: 0.16 K/UL — LOW (ref 1–3.3)
LYMPHOCYTES # BLD AUTO: 0.18 K/UL — LOW (ref 1–3.3)
LYMPHOCYTES # BLD AUTO: 11.9 % — LOW (ref 13–44)
LYMPHOCYTES # BLD AUTO: 12 % — LOW (ref 13–44)
MAGNESIUM SERPL-MCNC: 1.8 MG/DL — SIGNIFICANT CHANGE UP (ref 1.6–2.6)
MCHC RBC-ENTMCNC: 31.1 PG — SIGNIFICANT CHANGE UP (ref 27–34)
MCHC RBC-ENTMCNC: 32.1 GM/DL — SIGNIFICANT CHANGE UP (ref 32–36)
MCHC RBC-ENTMCNC: 32.4 PG — SIGNIFICANT CHANGE UP (ref 27–34)
MCHC RBC-ENTMCNC: 34 G/DL — SIGNIFICANT CHANGE UP (ref 32–36)
MCV RBC AUTO: 95.3 FL — SIGNIFICANT CHANGE UP (ref 80–100)
MCV RBC AUTO: 97.1 FL — SIGNIFICANT CHANGE UP (ref 80–100)
MONOCYTES # BLD AUTO: 0.23 K/UL — SIGNIFICANT CHANGE UP (ref 0–0.9)
MONOCYTES # BLD AUTO: 0.33 K/UL — SIGNIFICANT CHANGE UP (ref 0–0.9)
MONOCYTES NFR BLD AUTO: 17 % — HIGH (ref 2–14)
MONOCYTES NFR BLD AUTO: 22 % — HIGH (ref 2–14)
NEUTROPHILS # BLD AUTO: 0.93 K/UL — LOW (ref 1.8–7.4)
NEUTROPHILS # BLD AUTO: 0.96 K/UL — LOW (ref 1.8–7.4)
NEUTROPHILS NFR BLD AUTO: 65 % — SIGNIFICANT CHANGE UP (ref 43–77)
NEUTROPHILS NFR BLD AUTO: 68.9 % — SIGNIFICANT CHANGE UP (ref 43–77)
NRBC # BLD: 0 /100 — SIGNIFICANT CHANGE UP (ref 0–0)
NRBC # BLD: 2 /100 WBCS — SIGNIFICANT CHANGE UP
NRBC # BLD: SIGNIFICANT CHANGE UP /100 WBCS (ref 0–0)
NRBC # FLD: 0.02 K/UL — HIGH
PLAT MORPH BLD: NORMAL — SIGNIFICANT CHANGE UP
PLATELET # BLD AUTO: 177 K/UL — SIGNIFICANT CHANGE UP (ref 150–400)
PLATELET # BLD AUTO: 184 K/UL — SIGNIFICANT CHANGE UP (ref 150–400)
POTASSIUM SERPL-MCNC: 3.8 MMOL/L — SIGNIFICANT CHANGE UP (ref 3.5–5.3)
POTASSIUM SERPL-SCNC: 3.8 MMOL/L — SIGNIFICANT CHANGE UP (ref 3.5–5.3)
PROT SERPL-MCNC: 7.9 G/DL — SIGNIFICANT CHANGE UP (ref 6–8.3)
RBC # BLD: 2.73 M/UL — LOW (ref 4.2–5.8)
RBC # BLD: 2.75 M/UL — LOW (ref 4.2–5.8)
RBC # FLD: 13.8 % — SIGNIFICANT CHANGE UP (ref 10.3–14.5)
RBC # FLD: 13.8 % — SIGNIFICANT CHANGE UP (ref 10.3–14.5)
RBC BLD AUTO: SIGNIFICANT CHANGE UP
SARS-COV-2 RNA SPEC QL NAA+PROBE: SIGNIFICANT CHANGE UP
SODIUM SERPL-SCNC: 148 MMOL/L — HIGH (ref 135–145)
WBC # BLD: 1.35 K/UL — LOW (ref 3.8–10.5)
WBC # BLD: 1.48 K/UL — LOW (ref 3.8–10.5)
WBC # FLD AUTO: 1.35 K/UL — LOW (ref 3.8–10.5)
WBC # FLD AUTO: 1.48 K/UL — LOW (ref 3.8–10.5)

## 2021-08-13 PROCEDURE — 77427 RADIATION TX MANAGEMENT X5: CPT

## 2021-08-13 PROCEDURE — 99285 EMERGENCY DEPT VISIT HI MDM: CPT

## 2021-08-13 PROCEDURE — 70490 CT SOFT TISSUE NECK W/O DYE: CPT | Mod: 26

## 2021-08-13 PROCEDURE — 77387B: CUSTOM | Mod: 26

## 2021-08-13 PROCEDURE — 99215 OFFICE O/P EST HI 40 MIN: CPT

## 2021-08-13 PROCEDURE — 99223 1ST HOSP IP/OBS HIGH 75: CPT

## 2021-08-13 RX ORDER — BENZOCAINE AND MENTHOL 5; 1 G/100ML; G/100ML
1 LIQUID ORAL
Refills: 0 | Status: DISCONTINUED | OUTPATIENT
Start: 2021-08-13 | End: 2021-08-21

## 2021-08-13 RX ORDER — ONDANSETRON 8 MG/1
4 TABLET, FILM COATED ORAL EVERY 8 HOURS
Refills: 0 | Status: DISCONTINUED | OUTPATIENT
Start: 2021-08-13 | End: 2021-08-21

## 2021-08-13 RX ORDER — SODIUM CHLORIDE 9 MG/ML
1000 INJECTION INTRAMUSCULAR; INTRAVENOUS; SUBCUTANEOUS ONCE
Refills: 0 | Status: COMPLETED | OUTPATIENT
Start: 2021-08-13 | End: 2021-08-13

## 2021-08-13 RX ORDER — ENOXAPARIN SODIUM 100 MG/ML
40 INJECTION SUBCUTANEOUS DAILY
Refills: 0 | Status: DISCONTINUED | OUTPATIENT
Start: 2021-08-13 | End: 2021-08-13

## 2021-08-13 RX ORDER — LANOLIN ALCOHOL/MO/W.PET/CERES
3 CREAM (GRAM) TOPICAL AT BEDTIME
Refills: 0 | Status: DISCONTINUED | OUTPATIENT
Start: 2021-08-13 | End: 2021-08-21

## 2021-08-13 RX ORDER — ACETAMINOPHEN 500 MG
650 TABLET ORAL EVERY 6 HOURS
Refills: 0 | Status: DISCONTINUED | OUTPATIENT
Start: 2021-08-13 | End: 2021-08-13

## 2021-08-13 RX ORDER — HEPARIN SODIUM 5000 [USP'U]/ML
5000 INJECTION INTRAVENOUS; SUBCUTANEOUS EVERY 12 HOURS
Refills: 0 | Status: DISCONTINUED | OUTPATIENT
Start: 2021-08-13 | End: 2021-08-14

## 2021-08-13 RX ORDER — ACETAMINOPHEN 500 MG
500 TABLET ORAL ONCE
Refills: 0 | Status: COMPLETED | OUTPATIENT
Start: 2021-08-13 | End: 2021-08-13

## 2021-08-13 RX ORDER — SODIUM CHLORIDE 9 MG/ML
1000 INJECTION, SOLUTION INTRAVENOUS
Refills: 0 | Status: DISCONTINUED | OUTPATIENT
Start: 2021-08-13 | End: 2021-08-14

## 2021-08-13 RX ORDER — ACETAMINOPHEN 500 MG
1000 TABLET ORAL ONCE
Refills: 0 | Status: COMPLETED | OUTPATIENT
Start: 2021-08-13 | End: 2021-08-13

## 2021-08-13 RX ADMIN — SODIUM CHLORIDE 75 MILLILITER(S): 9 INJECTION, SOLUTION INTRAVENOUS at 22:01

## 2021-08-13 RX ADMIN — Medication 500 MILLIGRAM(S): at 22:58

## 2021-08-13 RX ADMIN — Medication 200 MILLIGRAM(S): at 21:47

## 2021-08-13 RX ADMIN — SODIUM CHLORIDE 1000 MILLILITER(S): 9 INJECTION INTRAMUSCULAR; INTRAVENOUS; SUBCUTANEOUS at 17:08

## 2021-08-13 RX ADMIN — Medication 400 MILLIGRAM(S): at 17:08

## 2021-08-13 NOTE — CONSULT NOTE ADULT - SUBJECTIVE AND OBJECTIVE BOX
HPI:  Patient is a 71y Male with PMH significant for throat cancer currently treating with chemo/radiation who is here for odynophagia. Pain began three days ago and has slowly worsened. Patient is unable to tolerate liquids or solids and has noticed he is spitting up secretions. He denies any fevers/recent infections. He states his voice has changed midly. Denies any difficulty breathing or increased shortness of breathe.      Physical Exam  T(C): 37.4 (08-13-21 @ 14:46), Max: 37.4 (08-13-21 @ 14:46)  HR: 105 (08-13-21 @ 14:46) (105 - 105)  BP: 126/79 (08-13-21 @ 14:46) (126/79 - 126/79)  RR: 22 (08-13-21 @ 14:46) (22 - 22)  SpO2: 98% (08-13-21 @ 14:46) (98% - 98%)    General: NAD, A+Ox3  No respiratory distress, stridor, or stertor  Voice quality: mildly hoarse  Face:  Symmetric without masses or lesions  OU: PERRL, EOMI  Nose: nasal cavity clear bilaterally, inferior turbinates normal, mucosa normal without crusting or bleeding  OC/OP: tongue normal, floor of mouth wnl, no masses or lesions, OP clear  Neck: skin break down following radiation changes, neck with fibrotic changes palpated associated with CRT    Procedure: Flexible laryngoscopy    Pre-procedure diagnosis/Indication for procedure: To evaluate larynx    Anesthesia: None    Description:    A flexible endoscope was used to examine the left and right nasal cavities, posterior oropharynx, hypopharynx, and larynx. The nasal valve areas were examined for abnormalities or collapse. The inferior and middle turbinates were evaluated. The middle and superior meatuses, the sphenoethmoid recesses, and the nasopharynx were examined and inspected for mucopurulence, polyps, and nasal masses. The oropharynx, tongue base/vallecula, epiglottis, aryepiglottic folds, arytenoids, vocal cords, hypopharynx were also inspected for swelling, inflammation, or dysfunction. The patient tolerated the procedure without complications.    Findings:    NP wnl  BOT/vallecula normal  Epiglottis sharp  AE folds nonedematous  Arytenoids mobile  Vocal folds mobile bilaterally  No masses or lesions visualized in post cricoid space or pyriform sinuses bilaterally    A/P:  71 year old Male with past medical history of throat cancer currently being treated with CRT, ENT consulted for airway eval along with odynophagia.   - pain more likely associated with CRT  - no signs of infection on oral cavity exam  - Airway clear on scope exam  - pain control, can do benzocaine lozenges to help with odynophagia  - follow up CT neck  - heme onc/rad onc care  - PEG per primary team   - IVF  --------------------------------------------------------------  Thank you for the consult,    Delfino Dao MD  Resident  Department of Otolaryngology - Head and Neck Surgery  Spectra #[id number]  Peds Page #[phone]  Adult Page #[phone]  ---------------------------------------------------------------

## 2021-08-13 NOTE — H&P ADULT - NSHPREVIEWOFSYSTEMS_GEN_ALL_CORE
REVIEW OF SYSTEMS:    CONSTITUTIONAL: No weakness, fevers or chills  EYES/ENT: No visual changes;  + odynophagia; No sore throat; No rhinorrhea; No sinus pain/pressure  NECK: No pain or stiffness  RESPIRATORY: No cough, wheezing, hemoptysis; No shortness of breath  CARDIOVASCULAR: No chest pain or palpitations; No lower extremity edema  GASTROINTESTINAL: No abdominal or epigastric pain. No nausea, vomiting, or hematemesis; No diarrhea or constipation. No melena or hematochezia.  GENITOURINARY: No dysuria, frequency or hematuria  NEUROLOGICAL: No numbness or weakness  MSK: ambulates independently   SKIN: No itching, burning, rashes, or lesions   All other review of systems is negative unless indicated above.

## 2021-08-13 NOTE — ED PROVIDER NOTE - PHYSICAL EXAMINATION
Gen: NAD, non-toxic appearing  Head: normal appearing  HEENT: normal conjunctiva, oral mucosa moist. radiation burns on neck, increased secretions in mouth, no burns inside mouth, inflammation and mass on L side of neck and inside mouth   Lung: no respiratory distress, speaking in full sentences, CTA b/l     CV: regular rate and rhythm, no murmurs  Abd: soft, non distended, non tender   MSK: no visible deformities  Neuro: No focal deficits, AAOx3  Skin: Warm  Psych: normal affect

## 2021-08-13 NOTE — ED PROVIDER NOTE - ATTENDING CONTRIBUTION TO CARE
I performed a face-to-face evaluation of the patient and performed a history and physical examination. I agree with the history and physical examination.    Throat cancer, XRT, sent from [facility] for difficulty swallowing and throat pain, voice changes and difficulty speaking. Poor PO intake. No F. No SOB. PE: edema to back of throat, XRT burns to skin of neck. Concern for radiation pharyngitis/laryngitis. Notify ENT. Pain meds. IVF. Labs.

## 2021-08-13 NOTE — ED PROVIDER NOTE - PROGRESS NOTE DETAILS
consulted ENT did scope and reviewed CT was cleared from ENT not likely infection and has patent airway. will admit to medicine for onc eval, peg tube eval and feeding/ hydration

## 2021-08-13 NOTE — ED ADULT NURSE NOTE - OBJECTIVE STATEMENT
Pt w/ hx of throat CA on radiation sent from [facility] oncology for difficulty swallowing his own secretions Pt p/w NAD breaths even unlabored, radiation burns to anterior neck are chronic otherwise skin warm dry intact, 20 g IV access obtained at l.AC labs as ordered

## 2021-08-13 NOTE — H&P ADULT - NSHPLABSRESULTS_GEN_ALL_CORE
8.5    1.35  )-----------( 177      ( 13 Aug 2021 16:36 )             26.5       08-13    148<H>  |  107  |  40<H>  ----------------------------<  105<H>  3.8   |  24  |  1.76<H>    Ca    10.1      13 Aug 2021 16:36  Mg     1.80     08-13    TPro  7.9  /  Alb  4.2  /  TBili  0.6  /  DBili  x   /  AST  14  /  ALT  13  /  AlkPhos  54  08-13    16:36 - VBG - pH: 7.39  | pCO2: 44    | pO2: 29    | Lactate: 1.4        EKG reviewed by myself: NSR    CT neck reported by radiology as: Without contrast, there is suboptimal evaluation of the region of known malignancy centered around the left base of tongue. There is less prominence within the primary site which may suggest interval improvement. The oropharyngeal airway is patent.    There is limited evaluation of the lymph nodes without contrast. There is apparent marked improvement or resolution of a previously seen pathologic left level 2 lymph node. No new adenopathy.

## 2021-08-13 NOTE — ED PROVIDER NOTE - OBJECTIVE STATEMENT
pt is a 72 yo M pmhx of throat cancer presenting with throat pain and difficulty swallowing. Pt comes in following a radiation treatment for his throat cancer. pt is a 70 yo M pmhx throat cancer presenting with throat pain and difficulty swallowing. Pt comes in following a radiation treatment for his throat cancer at [facility]. He started treatments at the end of June of this year. He has had increasing difficulty swallowing and throat pain over the last several days. He reports increase in secretions with cough. He is not able to eat or drink anything over the last two days. He also reports voice changes and difficultly speaking. He is able to breath w/o SOB. He denies CP F/C N/V.

## 2021-08-13 NOTE — ED ADULT NURSE REASSESSMENT NOTE - NS ED NURSE REASSESS COMMENT FT1
Patient received from day RN. Patient resting quietly in bed, breathing even and nonlabored. Patient denies any SOB. Patient reports he is not swallowing his spit as easily but denies any distress. No distress visible. Pulsox 100% on RA. Patient complaining of pain. Will medicate as ordered. Safety maintained. Patient stable upon exiting the room.

## 2021-08-13 NOTE — H&P ADULT - PROBLEM SELECTOR PLAN 3
moderate exacerbation  Unable to eat over the past 3 days and overall a thin elderly male  GI consult for possible PEG  LR 75ml/hr  Advance diet as tolerated and consider adding supplements if odynophagia improves

## 2021-08-13 NOTE — H&P ADULT - PROBLEM SELECTOR PLAN 1
New  3 day history of odynophagia with solids and liquids. pain 8/10  CT neck as above  ENT consulted: airway clear on scope exam, benzocaine lozenges  LR 75ml/hr  Advance diet as tolerated with aspiration precautions   Heme onc/Rad onc consult in AM  GI emailed for possible PEG New  3 day history of odynophagia with solids and liquids. pain 8/10  CT neck as above  ENT consulted: airway clear on scope exam, benzocaine lozenges  LR 75ml/hr  Advance diet as tolerated with aspiration precautions   Heme onc/Rad onc consult in AM  GI emailed for possible PEG    Cr 1.7 lwith unknown baseline- monitor with IVF

## 2021-08-13 NOTE — H&P ADULT - NSHPSOCIALHISTORY_GEN_ALL_CORE
Does not use tobacco products, consume alcohol or partake in illicit drug use   lives at home with his wife

## 2021-08-13 NOTE — ED ADULT TRIAGE NOTE - CHIEF COMPLAINT QUOTE
has hx of throat Ca is under going radiation therapy noted to have radiation  burns anterior neck finished chemo last week

## 2021-08-13 NOTE — H&P ADULT - HISTORY OF PRESENT ILLNESS
71 yr old male with a pmh of throat cancer- recently just completed 7 weeks of chemo/radiation who presents with a 3 day history of increasing difficulty with swallowing both liquids and solids and this is associated with an 8/10 throat pain. He has an increase in secretions and "phlegm" with difficulty in swallowing his secretions at times.   He is amenable for PEG placement      Vitals in the ED T 99.4, , /79, RR 22 satting 98% on RA

## 2021-08-13 NOTE — ED PROVIDER NOTE - CLINICAL SUMMARY MEDICAL DECISION MAKING FREE TEXT BOX
70yo M with known throat cancer presents with trouble swallowing difficulty speaking and throat pain. Will get labs and CT imaging of neck. Will give fluids for volume depletion.

## 2021-08-13 NOTE — ED PROVIDER NOTE - NS ED ROS FT
GENERAL: No fever, no chills  EYES: no change in vision  HEENT: + trouble swallowing, + trouble speaking + trouble eating and drinking +voice changes + difficulty speaking   CARDIAC: no chest pain, no palpitations  PULMONARY: +cough, no SOB  GI: no abdominal pain, no nausea, no vomiting, no diarrhea, no constipation  : no dysuria, no frequency, no change in appearance, no odor of urine  SKIN: no rashes  NEURO: no headache, no weakness  MSK: no joint pain

## 2021-08-13 NOTE — H&P ADULT - NSHPPHYSICALEXAM_GEN_ALL_CORE
PHYSICAL EXAM:  GENERAL: NAD, well-developed, thin  HEAD:  Atraumatic, Normocephalic  EYES: EOMI, PERRL, conjunctiva and sclera clear  NECK: Supple, No JVD  CHEST/LUNG: Clear to auscultation bilaterally; No wheezes, rales or rhonchi  HEART: Regular rate and rhythm; No murmurs, rubs, or gallops, (+)S1, S2  ABDOMEN: Soft, Nontender, Nondistended; Normal Bowel sounds   EXTREMITIES:  2+ Peripheral Pulses, No clubbing, cyanosis, or edema  PSYCH: normal mood and affect  NEUROLOGY: AAOx3, non-focal  SKIN: radiation changes to the left neck

## 2021-08-14 DIAGNOSIS — E87.0 HYPEROSMOLALITY AND HYPERNATREMIA: ICD-10-CM

## 2021-08-14 DIAGNOSIS — N17.9 ACUTE KIDNEY FAILURE, UNSPECIFIED: ICD-10-CM

## 2021-08-14 DIAGNOSIS — Z29.9 ENCOUNTER FOR PROPHYLACTIC MEASURES, UNSPECIFIED: ICD-10-CM

## 2021-08-14 LAB
ALBUMIN SERPL ELPH-MCNC: 3.4 G/DL — SIGNIFICANT CHANGE UP (ref 3.3–5)
ALP SERPL-CCNC: 54 U/L — SIGNIFICANT CHANGE UP (ref 40–120)
ALT FLD-CCNC: 11 U/L — SIGNIFICANT CHANGE UP (ref 4–41)
ANION GAP SERPL CALC-SCNC: 15 MMOL/L — HIGH (ref 7–14)
AST SERPL-CCNC: 12 U/L — SIGNIFICANT CHANGE UP (ref 4–40)
BILIRUB SERPL-MCNC: 0.5 MG/DL — SIGNIFICANT CHANGE UP (ref 0.2–1.2)
BUN SERPL-MCNC: 37 MG/DL — HIGH (ref 7–23)
CALCIUM SERPL-MCNC: 9.5 MG/DL — SIGNIFICANT CHANGE UP (ref 8.4–10.5)
CHLORIDE SERPL-SCNC: 111 MMOL/L — HIGH (ref 98–107)
CO2 SERPL-SCNC: 20 MMOL/L — LOW (ref 22–31)
COVID-19 SPIKE DOMAIN AB INTERP: POSITIVE
COVID-19 SPIKE DOMAIN ANTIBODY RESULT: 247 U/ML — HIGH
CREAT SERPL-MCNC: 1.53 MG/DL — HIGH (ref 0.5–1.3)
GLUCOSE SERPL-MCNC: 71 MG/DL — SIGNIFICANT CHANGE UP (ref 70–99)
HCV AB S/CO SERPL IA: 0.77 S/CO — SIGNIFICANT CHANGE UP (ref 0–0.99)
HCV AB SERPL-IMP: SIGNIFICANT CHANGE UP
POTASSIUM SERPL-MCNC: 4 MMOL/L — SIGNIFICANT CHANGE UP (ref 3.5–5.3)
POTASSIUM SERPL-SCNC: 4 MMOL/L — SIGNIFICANT CHANGE UP (ref 3.5–5.3)
PROT SERPL-MCNC: 7 G/DL — SIGNIFICANT CHANGE UP (ref 6–8.3)
SARS-COV-2 IGG+IGM SERPL QL IA: 247 U/ML — HIGH
SARS-COV-2 IGG+IGM SERPL QL IA: POSITIVE
SODIUM SERPL-SCNC: 146 MMOL/L — HIGH (ref 135–145)

## 2021-08-14 PROCEDURE — ZZZZZ: CPT

## 2021-08-14 PROCEDURE — 99223 1ST HOSP IP/OBS HIGH 75: CPT | Mod: GC

## 2021-08-14 PROCEDURE — 99223 1ST HOSP IP/OBS HIGH 75: CPT

## 2021-08-14 PROCEDURE — 99233 SBSQ HOSP IP/OBS HIGH 50: CPT

## 2021-08-14 RX ORDER — HEPARIN SODIUM 5000 [USP'U]/ML
5000 INJECTION INTRAVENOUS; SUBCUTANEOUS EVERY 8 HOURS
Refills: 0 | Status: DISCONTINUED | OUTPATIENT
Start: 2021-08-14 | End: 2021-08-21

## 2021-08-14 RX ORDER — SODIUM CHLORIDE 9 MG/ML
1000 INJECTION, SOLUTION INTRAVENOUS
Refills: 0 | Status: DISCONTINUED | OUTPATIENT
Start: 2021-08-14 | End: 2021-08-15

## 2021-08-14 RX ADMIN — HEPARIN SODIUM 5000 UNIT(S): 5000 INJECTION INTRAVENOUS; SUBCUTANEOUS at 07:02

## 2021-08-14 RX ADMIN — SODIUM CHLORIDE 100 MILLILITER(S): 9 INJECTION, SOLUTION INTRAVENOUS at 16:54

## 2021-08-14 RX ADMIN — HEPARIN SODIUM 5000 UNIT(S): 5000 INJECTION INTRAVENOUS; SUBCUTANEOUS at 16:53

## 2021-08-14 RX ADMIN — SODIUM CHLORIDE 75 MILLILITER(S): 9 INJECTION, SOLUTION INTRAVENOUS at 12:31

## 2021-08-14 NOTE — CONSULT NOTE ADULT - SUBJECTIVE AND OBJECTIVE BOX
HPI:  71 yr old male with a pmh of throat cancer- recently just completed 7 weeks of chemo/radiation who presents with a 3 day history of increasing difficulty with swallowing both liquids and solids and this is associated with an 8/10 throat pain. He has an increase in secretions and "phlegm" with difficulty in swallowing his secretions at times.   He is amenable for PEG placement            PAST MEDICAL & SURGICAL HISTORY:  Oropharynx cancer  No significant past surgical history          Review of Systems:  General: denies fevers/chills  Respiratory: denies cough, shortness of breath  Cardiovascular: denies chest pain, palpitations  Gastrointestinal: odynophagia  MSK: denies joint pain or muscle pain  Neuro: denies headache, weakness, or parasthesias  Psych: denies anxiety or sleep disturbances            MEDICATIONS  (STANDING):  dextrose 5% + sodium chloride 0.45%. 1000 milliLiter(s) (100 mL/Hr) IV Continuous <Continuous>  heparin   Injectable 5000 Unit(s) SubCutaneous every 8 hours        MEDICATIONS  (PRN):  aluminum hydroxide/magnesium hydroxide/simethicone Suspension 30 milliLiter(s) Oral every 4 hours PRN Dyspepsia  benzocaine 15 mG/menthol 3.6 mG (Sugar-Free) Lozenge 1 Lozenge Oral every 2 hours PRN Sore Throat  melatonin 3 milliGRAM(s) Oral at bedtime PRN Insomnia  ondansetron Injectable 4 milliGRAM(s) IV Push every 8 hours PRN Nausea and/or Vomiting          Allergies  No Known Allergies  Intolerances        SOCIAL HISTORY:  No EtOH, no tobacco        FAMILY HISTORY:  No pertinent family history in first degree relatives  unless noted, no significant family hx with Mother, Father, Siblings        Vital Signs Last 24 Hrs  T(C): 36.8 (14 Aug 2021 07:00), Max: 37.1 (14 Aug 2021 01:38)  T(F): 98.2 (14 Aug 2021 07:00), Max: 98.7 (14 Aug 2021 01:38)  HR: 69 (14 Aug 2021 07:00) (66 - 102)  BP: 134/73 (14 Aug 2021 07:00) (116/72 - 161/113)  BP(mean): --  RR: 17 (14 Aug 2021 07:00) (15 - 20)  SpO2: 100% (14 Aug 2021 07:00) (100% - 100%)          PHYSICAL EXAM  Constitutional: NAD  Respiratory: symmetric chest rise, with normal respiratory effort  Cardiovascular: RRR  Gastrointestinal: soft, NTND  Extremities:  no edema  MSK: no obvious abnormalities  Neurological: Grossly intact  Skin: radiation dermatitis around neck  Psych: normal affect        LABS:                        8.5    1.35  )-----------( 177      ( 13 Aug 2021 16:36 )             26.5     08-14    146<H>  |  111<H>  |  37<H>  ----------------------------<  71  4.0   |  20<L>  |  1.53<H>    Ca    9.5      14 Aug 2021 07:10  Phos  3.0     08-14  Mg     1.80     08-14    TPro  7.0  /  Alb  3.4  /  TBili  0.5  /  DBili  x   /  AST  12  /  ALT  11  /  AlkPhos  54  08-14          RADIOLOGY & ADDITIONAL STUDIES:    ASSESSMENT:   71yMalePAST MEDICAL & SURGICAL HISTORY:  Oropharynx cancer  No significant past surgical history          HEALTH ISSUES - R/O PROBLEM Dx: request for PEG          PLAN:  will discuss case with Dr Peñaloza and follow  continue care per primary team    Lucero FARRELL [id number]

## 2021-08-14 NOTE — PROGRESS NOTE ADULT - SUBJECTIVE AND OBJECTIVE BOX
Patient is a 71y old  Male who presents with a chief complaint of odynophagia (14 Aug 2021 08:21)      SUBJECTIVE / OVERNIGHT EVENTS: Pt seen and examined, Chart reviewed. Still can not swallow anything. Denies any CP or SOB.     MEDICATIONS  (STANDING):  heparin   Injectable 5000 Unit(s) SubCutaneous every 12 hours  lactated ringers. 1000 milliLiter(s) (75 mL/Hr) IV Continuous <Continuous>    MEDICATIONS  (PRN):  aluminum hydroxide/magnesium hydroxide/simethicone Suspension 30 milliLiter(s) Oral every 4 hours PRN Dyspepsia  benzocaine 15 mG/menthol 3.6 mG (Sugar-Free) Lozenge 1 Lozenge Oral every 2 hours PRN Sore Throat  melatonin 3 milliGRAM(s) Oral at bedtime PRN Insomnia  ondansetron Injectable 4 milliGRAM(s) IV Push every 8 hours PRN Nausea and/or Vomiting      Vital Signs Last 24 Hrs  T(C): 36.8 (14 Aug 2021 07:00), Max: 37.4 (13 Aug 2021 14:46)  T(F): 98.2 (14 Aug 2021 07:00), Max: 99.4 (13 Aug 2021 14:46)  HR: 69 (14 Aug 2021 07:00) (66 - 105)  BP: 134/73 (14 Aug 2021 07:00) (116/72 - 161/113)  BP(mean): --  RR: 17 (14 Aug 2021 07:00) (15 - 22)  SpO2: 100% (14 Aug 2021 07:00) (98% - 100%)  CAPILLARY BLOOD GLUCOSE        I&O's Summary      PHYSICAL EXAM:  GENERAL: NAD, well-developed  HEAD:  Atraumatic, Normocephalic  EYES: EOMI, PERRLA, conjunctiva and sclera clear  NECK: Supple, No JVD  CHEST/LUNG: Clear to auscultation bilaterally; No wheeze  HEART: Regular rate and rhythm; No murmurs, rubs, or gallops  ABDOMEN: Soft, Nontender, Nondistended; Bowel sounds present  EXTREMITIES:  2+ Peripheral Pulses, No clubbing, cyanosis, or edema  PSYCH: AAOx3  NEUROLOGY: non-focal  SKIN: No rashes or lesions    LABS:                        8.5    1.35  )-----------( 177      ( 13 Aug 2021 16:36 )             26.5     08-14    146<H>  |  111<H>  |  37<H>  ----------------------------<  71  4.0   |  20<L>  |  1.53<H>    Ca    9.5      14 Aug 2021 07:10  Phos  3.0     08-14  Mg     1.80     08-14    TPro  7.0  /  Alb  3.4  /  TBili  0.5  /  DBili  x   /  AST  12  /  ALT  11  /  AlkPhos  54  08-14              RADIOLOGY & ADDITIONAL TESTS:    Imaging Personally Reviewed:    Consultant(s) Notes Reviewed:  Onc     Care Discussed with Consultants/Other Providers:

## 2021-08-14 NOTE — CONSULT NOTE ADULT - ATTENDING COMMENTS
1 year old Male with past medical history of throat cancer currently being treated with CRT, ENT consulted for airway eval along with odynophagia.   - pain more likely associated with CRT  - no signs of infection on oral cavity exam  - Airway clear on scope exam  - pain control, can do benzocaine lozenges to help with odynophagia  - follow up CT neck  - heme onc/rad onc care  - PEG per primary team   - IVF  Thank you for your referal
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 Cont supportive care and arrange for PEG for nutritional support.

## 2021-08-14 NOTE — CONSULT NOTE ADULT - SUBJECTIVE AND OBJECTIVE BOX
Hematology Oncology Consult Note    HPI:  71 yr old male with a pmh of throat cancer- recently just completed 7 weeks of chemo/radiation who presents with a 3 day history of increasing difficulty with swallowing both liquids and solids and this is associated with an 8/10 throat pain. He has an increase in secretions and "phlegm" with difficulty in swallowing his secretions at times.   He is amenable for PEG placement      Vitals in the ED T 99.4, , /79, RR 22 satting 98% on RA (13 Aug 2021 21:07)      PAST MEDICAL & SURGICAL HISTORY:  Oropharynx cancer    No significant past surgical history        FAMILY HISTORY:  No pertinent family history in first degree relatives        MEDICATIONS  (STANDING):  heparin   Injectable 5000 Unit(s) SubCutaneous every 12 hours  lactated ringers. 1000 milliLiter(s) (75 mL/Hr) IV Continuous <Continuous>    MEDICATIONS  (PRN):  aluminum hydroxide/magnesium hydroxide/simethicone Suspension 30 milliLiter(s) Oral every 4 hours PRN Dyspepsia  benzocaine 15 mG/menthol 3.6 mG (Sugar-Free) Lozenge 1 Lozenge Oral every 2 hours PRN Sore Throat  melatonin 3 milliGRAM(s) Oral at bedtime PRN Insomnia  ondansetron Injectable 4 milliGRAM(s) IV Push every 8 hours PRN Nausea and/or Vomiting      Allergies    No Known Allergies    Intolerances        SOCIAL HISTORY: No EtOH, no tobacco    Review of Systems:  General: denies fevers/chills  Respiratory: denies cough, shortness of breath  Cardiovascular: denies chest pain, palpitations  Gastrointestinal: denies nausea, vomiting, abdominal pain, constipation, diarrhea, melena, hematochezia  MSK: denies joint pain or muscle pain  Neuro: denies headache, weakness, or parasthesias  Skin: denies rash, petichiae, echymoses  Psych: denies anxiety or sleep disturbances    Height (cm): 182.9 (08-13 @ 14:46)    T(F): 98.2 (08-14-21 @ 07:00), Max: 99.4 (08-13-21 @ 14:46)  HR: 69 (08-14-21 @ 07:00)  BP: 134/73 (08-14-21 @ 07:00)  RR: 17 (08-14-21 @ 07:00)  SpO2: 100% (08-14-21 @ 07:00)  Wt(kg): --    PHYSICAL EXAM:    Constitutional: NAD  Respiratory: CTA b/l, symmetric chest rise, with normal respiratory effort  Cardiovascular: RRR  Gastrointestinal: soft, NTND  Extremities:  no edema  MSK: no obvious abnormalities  Neurological: Grossly intact  Skin: no rash, no echymoses, no petichiae  Psych: normal affect                          8.5    1.35  )-----------( 177      ( 13 Aug 2021 16:36 )             26.5       08-14    146<H>  |  111<H>  |  37<H>  ----------------------------<  71  4.0   |  20<L>  |  1.53<H>    Ca    9.5      14 Aug 2021 07:10  Mg     1.80     08-13    TPro  7.0  /  Alb  3.4  /  TBili  0.5  /  DBili  x   /  AST  12  /  ALT  11  /  AlkPhos  54  08-14      Magnesium, Serum: 1.80 mg/dL (08-13 @ 16:36)       Hematology Oncology Consult Note    HPI:  71 yr old male with a pmh of throat cancer- recently just completed 7 weeks of chemo/radiation who presents with a 3 day history of increasing difficulty with swallowing both liquids and solids and this is associated with an 8/10 throat pain. He has an increase in secretions and "phlegm" with difficulty in swallowing his secretions at times.   He is amenable for PEG placement      Vitals in the ED T 99.4, , /79, RR 22 satting 98% on RA (13 Aug 2021 21:07)      PAST MEDICAL & SURGICAL HISTORY:  Oropharynx cancer    No significant past surgical history        FAMILY HISTORY:  No pertinent family history in first degree relatives        MEDICATIONS  (STANDING):  heparin   Injectable 5000 Unit(s) SubCutaneous every 12 hours  lactated ringers. 1000 milliLiter(s) (75 mL/Hr) IV Continuous <Continuous>    MEDICATIONS  (PRN):  aluminum hydroxide/magnesium hydroxide/simethicone Suspension 30 milliLiter(s) Oral every 4 hours PRN Dyspepsia  benzocaine 15 mG/menthol 3.6 mG (Sugar-Free) Lozenge 1 Lozenge Oral every 2 hours PRN Sore Throat  melatonin 3 milliGRAM(s) Oral at bedtime PRN Insomnia  ondansetron Injectable 4 milliGRAM(s) IV Push every 8 hours PRN Nausea and/or Vomiting      Allergies    No Known Allergies    Intolerances        SOCIAL HISTORY: No EtOH, no tobacco    Review of Systems:  General: denies fevers/chills  Respiratory: denies cough, shortness of breath  Cardiovascular: denies chest pain, palpitations  Gastrointestinal: odynophagia  MSK: denies joint pain or muscle pain  Neuro: denies headache, weakness, or parasthesias  Psych: denies anxiety or sleep disturbances    Height (cm): 182.9 (08-13 @ 14:46)    T(F): 98.2 (08-14-21 @ 07:00), Max: 99.4 (08-13-21 @ 14:46)  HR: 69 (08-14-21 @ 07:00)  BP: 134/73 (08-14-21 @ 07:00)  RR: 17 (08-14-21 @ 07:00)  SpO2: 100% (08-14-21 @ 07:00)  Wt(kg): --    PHYSICAL EXAM:    Constitutional: NAD  Respiratory: symmetric chest rise, with normal respiratory effort  Cardiovascular: RRR  Gastrointestinal: soft, NTND  Extremities:  no edema  MSK: no obvious abnormalities  Neurological: Grossly intact  Skin: radiation dermatitis around neck  Psych: normal affect                          8.5    1.35  )-----------( 177      ( 13 Aug 2021 16:36 )             26.5       08-14    146<H>  |  111<H>  |  37<H>  ----------------------------<  71  4.0   |  20<L>  |  1.53<H>    Ca    9.5      14 Aug 2021 07:10  Mg     1.80     08-13    TPro  7.0  /  Alb  3.4  /  TBili  0.5  /  DBili  x   /  AST  12  /  ALT  11  /  AlkPhos  54  08-14      Magnesium, Serum: 1.80 mg/dL (08-13 @ 16:36)

## 2021-08-14 NOTE — CONSULT NOTE ADULT - ASSESSMENT
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  - pain control per primary team; can consider palliative consult for help with pain  - recommend nutrition consult  - will likely need PEG placement (would consult thoracic surgery Dr Peñaloza)     Pancytopenia  - on admission pancytopenic (WBC 1.35, ANC 0.93, Hb 8.5, Plt 177)  - likely from recent chemo  - continue to trend CBC with diff daily  - transfuse to goal Hb >7, Plt >10k (>15k if febrile or >50k if bleeding)    Tonsillar squamous cell carcinoma  - T2,N1; Stage I  - p16+  - on concurrent chemoRT with cisplatin -> carboplatin/paclitaxel after C5 2/2 ZORAIDA  - finished carbo/paclitaxel 08/06 x2 cycles  - has two fractions RT remaining but per outpatient discussion with rad onc Dr. 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  - pain control per primary team; can consider palliative consult for help with pain  - recommend nutrition consult  - will  need PEG placement (would consult thoracic surgery Dr Peñaloza as discussed outpatient with Dr Gramajo)    Pancytopenia  - on admission pancytopenic (WBC 1.35, ANC 0.93, Hb 8.5, Plt 177)  - likely from recent chemo  - continue to trend CBC with diff daily  - transfuse to goal Hb >7, Plt >10k (>15k if febrile or >50k if bleeding)    Tonsillar squamous cell carcinoma  - T2,N1; Stage I  - p16+  - on concurrent chemoRT with cisplatin -> carboplatin/paclitaxel after C5 2/2 ZORAIDA  - finished carbo/paclitaxel 08/06 x2 cycles  - has two fractions RT remaining but per outpatient discussion with rad onc Dr. Arias will conclude RT with what he has received thus far  - f/u with Dr Gramajo at [facility]    ZORAIDA  - likely 2/2 prior cisplatin + pre-renal from poor PO intake  - improving with IVF, continue to trend        Mono Onofre MD  Hematology/Oncology Fellow

## 2021-08-14 NOTE — PATIENT PROFILE ADULT - FUNCTIONAL SCREEN CURRENT LEVEL: COMMUNICATION, MLM
pt. has throat issues with throat cancer. Difficult to speak for him./2 = difficulty speaking (not related to language barrier)

## 2021-08-15 DIAGNOSIS — D63.0 ANEMIA IN NEOPLASTIC DISEASE: ICD-10-CM

## 2021-08-15 LAB
ANION GAP SERPL CALC-SCNC: 13 MMOL/L — SIGNIFICANT CHANGE UP (ref 7–14)
BLD GP AB SCN SERPL QL: NEGATIVE — SIGNIFICANT CHANGE UP
BUN SERPL-MCNC: 26 MG/DL — HIGH (ref 7–23)
CALCIUM SERPL-MCNC: 9.2 MG/DL — SIGNIFICANT CHANGE UP (ref 8.4–10.5)
CHLORIDE SERPL-SCNC: 112 MMOL/L — HIGH (ref 98–107)
CO2 SERPL-SCNC: 24 MMOL/L — SIGNIFICANT CHANGE UP (ref 22–31)
CREAT SERPL-MCNC: 1.35 MG/DL — HIGH (ref 0.5–1.3)
GLUCOSE SERPL-MCNC: 110 MG/DL — HIGH (ref 70–99)
HCT VFR BLD CALC: 20.3 % — CRITICAL LOW (ref 39–50)
HCT VFR BLD CALC: 20.5 % — CRITICAL LOW (ref 39–50)
HCT VFR BLD CALC: 21.3 % — LOW (ref 39–50)
HGB BLD-MCNC: 6.6 G/DL — CRITICAL LOW (ref 13–17)
HGB BLD-MCNC: 6.7 G/DL — CRITICAL LOW (ref 13–17)
HGB BLD-MCNC: 6.9 G/DL — CRITICAL LOW (ref 13–17)
MAGNESIUM SERPL-MCNC: 1.5 MG/DL — LOW (ref 1.6–2.6)
MCHC RBC-ENTMCNC: 31 PG — SIGNIFICANT CHANGE UP (ref 27–34)
MCHC RBC-ENTMCNC: 31.8 PG — SIGNIFICANT CHANGE UP (ref 27–34)
MCHC RBC-ENTMCNC: 32 PG — SIGNIFICANT CHANGE UP (ref 27–34)
MCHC RBC-ENTMCNC: 32.4 GM/DL — SIGNIFICANT CHANGE UP (ref 32–36)
MCHC RBC-ENTMCNC: 32.5 GM/DL — SIGNIFICANT CHANGE UP (ref 32–36)
MCHC RBC-ENTMCNC: 32.7 GM/DL — SIGNIFICANT CHANGE UP (ref 32–36)
MCV RBC AUTO: 94.9 FL — SIGNIFICANT CHANGE UP (ref 80–100)
MCV RBC AUTO: 98.2 FL — SIGNIFICANT CHANGE UP (ref 80–100)
MCV RBC AUTO: 98.5 FL — SIGNIFICANT CHANGE UP (ref 80–100)
NRBC # BLD: 1 /100 WBCS — SIGNIFICANT CHANGE UP
NRBC # BLD: 2 /100 WBCS — SIGNIFICANT CHANGE UP
NRBC # BLD: 2 /100 WBCS — SIGNIFICANT CHANGE UP
NRBC # FLD: 0.02 K/UL — HIGH
NRBC # FLD: 0.03 K/UL — HIGH
NRBC # FLD: 0.04 K/UL — HIGH
PHOSPHATE SERPL-MCNC: 2.4 MG/DL — LOW (ref 2.5–4.5)
PLATELET # BLD AUTO: 202 K/UL — SIGNIFICANT CHANGE UP (ref 150–400)
PLATELET # BLD AUTO: 206 K/UL — SIGNIFICANT CHANGE UP (ref 150–400)
PLATELET # BLD AUTO: 224 K/UL — SIGNIFICANT CHANGE UP (ref 150–400)
POTASSIUM SERPL-MCNC: 3.6 MMOL/L — SIGNIFICANT CHANGE UP (ref 3.5–5.3)
POTASSIUM SERPL-SCNC: 3.6 MMOL/L — SIGNIFICANT CHANGE UP (ref 3.5–5.3)
RBC # BLD: 2.06 M/UL — LOW (ref 4.2–5.8)
RBC # BLD: 2.16 M/UL — LOW (ref 4.2–5.8)
RBC # BLD: 2.17 M/UL — LOW (ref 4.2–5.8)
RBC # FLD: 14 % — SIGNIFICANT CHANGE UP (ref 10.3–14.5)
RBC # FLD: 14.2 % — SIGNIFICANT CHANGE UP (ref 10.3–14.5)
RBC # FLD: 14.5 % — SIGNIFICANT CHANGE UP (ref 10.3–14.5)
RH IG SCN BLD-IMP: POSITIVE — SIGNIFICANT CHANGE UP
RH IG SCN BLD-IMP: POSITIVE — SIGNIFICANT CHANGE UP
SODIUM SERPL-SCNC: 149 MMOL/L — HIGH (ref 135–145)
WBC # BLD: 1.3 K/UL — LOW (ref 3.8–10.5)
WBC # BLD: 1.45 K/UL — LOW (ref 3.8–10.5)
WBC # BLD: 1.61 K/UL — LOW (ref 3.8–10.5)
WBC # FLD AUTO: 1.3 K/UL — LOW (ref 3.8–10.5)
WBC # FLD AUTO: 1.45 K/UL — LOW (ref 3.8–10.5)
WBC # FLD AUTO: 1.61 K/UL — LOW (ref 3.8–10.5)

## 2021-08-15 PROCEDURE — 99233 SBSQ HOSP IP/OBS HIGH 50: CPT

## 2021-08-15 RX ORDER — POTASSIUM PHOSPHATE, MONOBASIC POTASSIUM PHOSPHATE, DIBASIC 236; 224 MG/ML; MG/ML
15 INJECTION, SOLUTION INTRAVENOUS ONCE
Refills: 0 | Status: COMPLETED | OUTPATIENT
Start: 2021-08-15 | End: 2021-08-15

## 2021-08-15 RX ORDER — SODIUM,POTASSIUM PHOSPHATES 278-250MG
1 POWDER IN PACKET (EA) ORAL ONCE
Refills: 0 | Status: DISCONTINUED | OUTPATIENT
Start: 2021-08-15 | End: 2021-08-15

## 2021-08-15 RX ORDER — SODIUM CHLORIDE 9 MG/ML
1000 INJECTION, SOLUTION INTRAVENOUS
Refills: 0 | Status: DISCONTINUED | OUTPATIENT
Start: 2021-08-15 | End: 2021-08-16

## 2021-08-15 RX ORDER — MAGNESIUM SULFATE 500 MG/ML
1 VIAL (ML) INJECTION ONCE
Refills: 0 | Status: COMPLETED | OUTPATIENT
Start: 2021-08-15 | End: 2021-08-15

## 2021-08-15 RX ADMIN — HEPARIN SODIUM 5000 UNIT(S): 5000 INJECTION INTRAVENOUS; SUBCUTANEOUS at 05:22

## 2021-08-15 RX ADMIN — HEPARIN SODIUM 5000 UNIT(S): 5000 INJECTION INTRAVENOUS; SUBCUTANEOUS at 00:41

## 2021-08-15 RX ADMIN — POTASSIUM PHOSPHATE, MONOBASIC POTASSIUM PHOSPHATE, DIBASIC 62.5 MILLIMOLE(S): 236; 224 INJECTION, SOLUTION INTRAVENOUS at 14:46

## 2021-08-15 RX ADMIN — Medication 100 GRAM(S): at 14:46

## 2021-08-15 RX ADMIN — SODIUM CHLORIDE 150 MILLILITER(S): 9 INJECTION, SOLUTION INTRAVENOUS at 14:46

## 2021-08-15 RX ADMIN — HEPARIN SODIUM 5000 UNIT(S): 5000 INJECTION INTRAVENOUS; SUBCUTANEOUS at 14:46

## 2021-08-15 RX ADMIN — SODIUM CHLORIDE 100 MILLILITER(S): 9 INJECTION, SOLUTION INTRAVENOUS at 00:40

## 2021-08-15 RX ADMIN — HEPARIN SODIUM 5000 UNIT(S): 5000 INJECTION INTRAVENOUS; SUBCUTANEOUS at 21:09

## 2021-08-15 RX ADMIN — SODIUM CHLORIDE 150 MILLILITER(S): 9 INJECTION, SOLUTION INTRAVENOUS at 22:25

## 2021-08-15 RX ADMIN — SODIUM CHLORIDE 100 MILLILITER(S): 9 INJECTION, SOLUTION INTRAVENOUS at 03:02

## 2021-08-15 NOTE — PROGRESS NOTE ADULT - SUBJECTIVE AND OBJECTIVE BOX
Patient is a 71y old  Male who presents with a chief complaint of odynophagia (15 Aug 2021 07:41)      SUBJECTIVE / OVERNIGHT EVENTS: still can not swallow anything. No other complaints, denies any CP or SOB      MEDICATIONS  (STANDING):  dextrose 5% + sodium chloride 0.45%. 1000 milliLiter(s) (150 mL/Hr) IV Continuous <Continuous>  heparin   Injectable 5000 Unit(s) SubCutaneous every 8 hours    MEDICATIONS  (PRN):  aluminum hydroxide/magnesium hydroxide/simethicone Suspension 30 milliLiter(s) Oral every 4 hours PRN Dyspepsia  benzocaine 15 mG/menthol 3.6 mG (Sugar-Free) Lozenge 1 Lozenge Oral every 2 hours PRN Sore Throat  melatonin 3 milliGRAM(s) Oral at bedtime PRN Insomnia  ondansetron Injectable 4 milliGRAM(s) IV Push every 8 hours PRN Nausea and/or Vomiting      Vital Signs Last 24 Hrs  T(C): 36.7 (15 Aug 2021 05:18), Max: 36.7 (14 Aug 2021 15:00)  T(F): 98 (15 Aug 2021 05:18), Max: 98.1 (15 Aug 2021 00:38)  HR: 79 (15 Aug 2021 05:18) (78 - 90)  BP: 126/74 (15 Aug 2021 05:18) (126/74 - 139/80)  BP(mean): --  RR: 16 (15 Aug 2021 05:18) (15 - 17)  SpO2: 100% (15 Aug 2021 05:18) (100% - 100%)  CAPILLARY BLOOD GLUCOSE        I&O's Summary      PHYSICAL EXAM:  GENERAL: NAD, well-developed, thin   HEAD:  Atraumatic, Normocephalic  EYES: EOMI, PERRLA, conjunctiva and sclera clear  NECK: Supple, No JVD  CHEST/LUNG: Clear to auscultation bilaterally; No wheeze  HEART: Regular rate and rhythm; No murmurs, rubs, or gallops  ABDOMEN: Soft, Nontender, Nondistended; Bowel sounds present  EXTREMITIES:  2+ Peripheral Pulses, No clubbing, cyanosis, or edema  PSYCH: AAOx3  NEUROLOGY: non-focal  SKIN: No rashes or lesions    LABS:                        6.9    1.45  )-----------( 202      ( 15 Aug 2021 11:09 )             21.3     08-15    149<H>  |  112<H>  |  26<H>  ----------------------------<  110<H>  3.6   |  24  |  1.35<H>    Ca    9.2      15 Aug 2021 08:34  Phos  2.4     08-15  Mg     1.50     08-15    TPro  7.0  /  Alb  3.4  /  TBili  0.5  /  DBili  x   /  AST  12  /  ALT  11  /  AlkPhos  54  08-14              RADIOLOGY & ADDITIONAL TESTS:    Imaging Personally Reviewed:    Consultant(s) Notes Reviewed:      Care Discussed with Consultants/Other Providers:

## 2021-08-15 NOTE — PROGRESS NOTE ADULT - ASSESSMENT
71M h/o throat ca s/p recent chemoRT w/new onset dysphagia / odynophagia to both solids and liquids. Possible appendectomy when child per patient, otherwise no history of abdominal surgeries. No scars noted    - Still with dysphagia / odynophagia. Phlegm production slightly decreased. Not tolerating PO. Will require long-term feeding access  - will evaluate for PEG, likely this week  - will continue to follow  - [facility]  [id number]       71M h/o throat ca s/p recent chemoRT w/new onset dysphagia / odynophagia to both solids and liquids. Possible appendectomy when child per patient, otherwise no history of abdominal surgeries. No scars noted    - Still with dysphagia / odynophagia. Phlegm production slightly decreased. Not tolerating PO. Will require long-term feeding access  - Leukopenic; please medically optimized and provide medical clearance for PEG. Will add on for tomorrow if cleared / optimized  - above plan d/w Dr. Anabela Grande [facility] PGY6  Thoracic Surgery  [id number]

## 2021-08-15 NOTE — PROGRESS NOTE ADULT - SUBJECTIVE AND OBJECTIVE BOX
Thoracic Surgery  CC: dysphagia / odynophagia to both solids and liquids  HPI: 71M h/o throat ca s/p recent chemoRT w/new onset dysphagia / odynophagia to both solids and liquids. Possible appendectomy when child per patient, otherwise no history of abdominal surgeries. No scars noted  24/Overnight events: Patient seen and examined at bedside. Still with dysphagia / odynophagia. Phlegm production slightly decreased. Not tolerating PO.          Objective:  Vital Signs Last 24 Hrs  T(C): 36.7 (15 Aug 2021 05:18), Max: 36.7 (14 Aug 2021 15:00)  T(F): 98 (15 Aug 2021 05:18), Max: 98.1 (15 Aug 2021 00:38)  HR: 79 (15 Aug 2021 05:18) (78 - 90)  BP: 126/74 (15 Aug 2021 05:18) (126/74 - 139/80)  BP(mean): --  RR: 16 (15 Aug 2021 05:18) (15 - 17)  SpO2: 100% (15 Aug 2021 05:18) (100% - 100%)    Physical Exam:  General: comfortable, laying in bed  Respiratory: non-labored on room air  Abdomen: soft, non-tender, non-distended; no obvious surgical incisions    MEDICATIONS  (STANDING):  dextrose 5% + sodium chloride 0.45%. 1000 milliLiter(s) (100 mL/Hr) IV Continuous <Continuous>  heparin   Injectable 5000 Unit(s) SubCutaneous every 8 hours    MEDICATIONS  (PRN):  aluminum hydroxide/magnesium hydroxide/simethicone Suspension 30 milliLiter(s) Oral every 4 hours PRN Dyspepsia  benzocaine 15 mG/menthol 3.6 mG (Sugar-Free) Lozenge 1 Lozenge Oral every 2 hours PRN Sore Throat  melatonin 3 milliGRAM(s) Oral at bedtime PRN Insomnia  ondansetron Injectable 4 milliGRAM(s) IV Push every 8 hours PRN Nausea and/or Vomiting       LABS:                        8.5    1.35  )-----------( 177      ( 13 Aug 2021 16:36 )             26.5     08-14    146<H>  |  111<H>  |  37<H>  ----------------------------<  71  4.0   |  20<L>  |  1.53<H>    Ca    9.5      14 Aug 2021 07:10  Phos  3.0     08-14  Mg     1.80     08-14    TPro  7.0  /  Alb  3.4  /  TBili  0.5  /  DBili  x   /  AST  12  /  ALT  11  /  AlkPhos  54  08-14

## 2021-08-16 ENCOUNTER — OUTPATIENT (OUTPATIENT)
Dept: OUTPATIENT SERVICES | Facility: HOSPITAL | Age: 71
LOS: 1 days | Discharge: HOME | End: 2021-08-16

## 2021-08-16 DIAGNOSIS — D72.819 DECREASED WHITE BLOOD CELL COUNT, UNSPECIFIED: ICD-10-CM

## 2021-08-16 DIAGNOSIS — C09.9 MALIGNANT NEOPLASM OF TONSIL, UNSPECIFIED: ICD-10-CM

## 2021-08-16 DIAGNOSIS — E87.8 OTHER DISORDERS OF ELECTROLYTE AND FLUID BALANCE, NOT ELSEWHERE CLASSIFIED: ICD-10-CM

## 2021-08-16 LAB
ANION GAP SERPL CALC-SCNC: 10 MMOL/L — SIGNIFICANT CHANGE UP (ref 7–14)
ANION GAP SERPL CALC-SCNC: 12 MMOL/L — SIGNIFICANT CHANGE UP (ref 7–14)
ANION GAP SERPL CALC-SCNC: 8 MMOL/L — SIGNIFICANT CHANGE UP (ref 7–14)
ANISOCYTOSIS BLD QL: SLIGHT — SIGNIFICANT CHANGE UP
BUN SERPL-MCNC: 13 MG/DL — SIGNIFICANT CHANGE UP (ref 7–23)
BUN SERPL-MCNC: 14 MG/DL — SIGNIFICANT CHANGE UP (ref 7–23)
BUN SERPL-MCNC: 17 MG/DL — SIGNIFICANT CHANGE UP (ref 7–23)
CALCIUM SERPL-MCNC: 2.8 MG/DL — CRITICAL LOW (ref 8.4–10.5)
CALCIUM SERPL-MCNC: 8.5 MG/DL — SIGNIFICANT CHANGE UP (ref 8.4–10.5)
CALCIUM SERPL-MCNC: 8.6 MG/DL — SIGNIFICANT CHANGE UP (ref 8.4–10.5)
CHLORIDE SERPL-SCNC: 106 MMOL/L — SIGNIFICANT CHANGE UP (ref 98–107)
CHLORIDE SERPL-SCNC: 109 MMOL/L — HIGH (ref 98–107)
CHLORIDE SERPL-SCNC: 110 MMOL/L — HIGH (ref 98–107)
CO2 SERPL-SCNC: 24 MMOL/L — SIGNIFICANT CHANGE UP (ref 22–31)
CO2 SERPL-SCNC: 24 MMOL/L — SIGNIFICANT CHANGE UP (ref 22–31)
CO2 SERPL-SCNC: 25 MMOL/L — SIGNIFICANT CHANGE UP (ref 22–31)
CREAT SERPL-MCNC: 1.2 MG/DL — SIGNIFICANT CHANGE UP (ref 0.5–1.3)
CREAT SERPL-MCNC: 1.21 MG/DL — SIGNIFICANT CHANGE UP (ref 0.5–1.3)
CREAT SERPL-MCNC: 1.27 MG/DL — SIGNIFICANT CHANGE UP (ref 0.5–1.3)
GLUCOSE SERPL-MCNC: 107 MG/DL — HIGH (ref 70–99)
GLUCOSE SERPL-MCNC: 118 MG/DL — HIGH (ref 70–99)
GLUCOSE SERPL-MCNC: 94 MG/DL — SIGNIFICANT CHANGE UP (ref 70–99)
HCT VFR BLD CALC: 24.8 % — LOW (ref 39–50)
HCT VFR BLD CALC: 25 % — LOW (ref 39–50)
HGB BLD-MCNC: 7.9 G/DL — LOW (ref 13–17)
HGB BLD-MCNC: 8.3 G/DL — LOW (ref 13–17)
LYMPHOCYTES # BLD AUTO: 16 % — SIGNIFICANT CHANGE UP (ref 13–44)
LYMPHOCYTES # SPEC AUTO: 4 % — HIGH (ref 0–0)
MACROCYTES BLD QL: SLIGHT — SIGNIFICANT CHANGE UP
MAGNESIUM SERPL-MCNC: 1.3 MG/DL — LOW (ref 1.6–2.6)
MAGNESIUM SERPL-MCNC: 1.5 MG/DL — LOW (ref 1.6–2.6)
MAGNESIUM SERPL-MCNC: <0.5 MG/DL — CRITICAL LOW (ref 1.6–2.6)
MANUAL SMEAR VERIFICATION: SIGNIFICANT CHANGE UP
MCHC RBC-ENTMCNC: 30.2 PG — SIGNIFICANT CHANGE UP (ref 27–34)
MCHC RBC-ENTMCNC: 31.6 GM/DL — LOW (ref 32–36)
MCHC RBC-ENTMCNC: 31.9 PG — SIGNIFICANT CHANGE UP (ref 27–34)
MCHC RBC-ENTMCNC: 33.5 GM/DL — SIGNIFICANT CHANGE UP (ref 32–36)
MCV RBC AUTO: 95.4 FL — SIGNIFICANT CHANGE UP (ref 80–100)
MCV RBC AUTO: 95.4 FL — SIGNIFICANT CHANGE UP (ref 80–100)
METAMYELOCYTES # FLD: 2 % — HIGH (ref 0–1)
MONOCYTES NFR BLD AUTO: 30 % — HIGH (ref 2–14)
MYELOCYTES NFR BLD: 14 % — HIGH (ref 0–0)
NEUTROPHILS NFR BLD AUTO: 30 % — LOW (ref 43–77)
NEUTS BAND # BLD: 4 % — SIGNIFICANT CHANGE UP (ref 0–6)
NRBC # BLD: 2 /100 WBCS — SIGNIFICANT CHANGE UP
NRBC # BLD: 3 /100 WBCS — SIGNIFICANT CHANGE UP
NRBC # BLD: 4 /100 — SIGNIFICANT CHANGE UP
NRBC # FLD: 0.03 K/UL — HIGH
NRBC # FLD: 0.05 K/UL — HIGH
PHOSPHATE SERPL-MCNC: 2.7 MG/DL — SIGNIFICANT CHANGE UP (ref 2.5–4.5)
PHOSPHATE SERPL-MCNC: 2.7 MG/DL — SIGNIFICANT CHANGE UP (ref 2.5–4.5)
PHOSPHATE SERPL-MCNC: 2.9 MG/DL — SIGNIFICANT CHANGE UP (ref 2.5–4.5)
PLAT MORPH BLD: NORMAL — SIGNIFICANT CHANGE UP
PLATELET # BLD AUTO: 178 K/UL — SIGNIFICANT CHANGE UP (ref 150–400)
PLATELET # BLD AUTO: 211 K/UL — SIGNIFICANT CHANGE UP (ref 150–400)
POLYCHROMASIA BLD QL SMEAR: SLIGHT — SIGNIFICANT CHANGE UP
POTASSIUM SERPL-MCNC: 3.2 MMOL/L — LOW (ref 3.5–5.3)
POTASSIUM SERPL-MCNC: 3.4 MMOL/L — LOW (ref 3.5–5.3)
POTASSIUM SERPL-MCNC: 7.9 MMOL/L — CRITICAL HIGH (ref 3.5–5.3)
POTASSIUM SERPL-SCNC: 3.2 MMOL/L — LOW (ref 3.5–5.3)
POTASSIUM SERPL-SCNC: 3.4 MMOL/L — LOW (ref 3.5–5.3)
POTASSIUM SERPL-SCNC: 7.9 MMOL/L — CRITICAL HIGH (ref 3.5–5.3)
RBC # BLD: 2.6 M/UL — LOW (ref 4.2–5.8)
RBC # BLD: 2.62 M/UL — LOW (ref 4.2–5.8)
RBC # FLD: 14.7 % — HIGH (ref 10.3–14.5)
RBC # FLD: 14.7 % — HIGH (ref 10.3–14.5)
RBC BLD AUTO: ABNORMAL
SODIUM SERPL-SCNC: 142 MMOL/L — SIGNIFICANT CHANGE UP (ref 135–145)
SODIUM SERPL-SCNC: 142 MMOL/L — SIGNIFICANT CHANGE UP (ref 135–145)
SODIUM SERPL-SCNC: 144 MMOL/L — SIGNIFICANT CHANGE UP (ref 135–145)
WBC # BLD: 1.6 K/UL — LOW (ref 3.8–10.5)
WBC # BLD: 1.64 K/UL — LOW (ref 3.8–10.5)
WBC # FLD AUTO: 1.6 K/UL — LOW (ref 3.8–10.5)
WBC # FLD AUTO: 1.64 K/UL — LOW (ref 3.8–10.5)

## 2021-08-16 PROCEDURE — 99233 SBSQ HOSP IP/OBS HIGH 50: CPT

## 2021-08-16 PROCEDURE — 99232 SBSQ HOSP IP/OBS MODERATE 35: CPT

## 2021-08-16 RX ORDER — POTASSIUM CHLORIDE 20 MEQ
10 PACKET (EA) ORAL
Refills: 0 | Status: COMPLETED | OUTPATIENT
Start: 2021-08-16 | End: 2021-08-16

## 2021-08-16 RX ORDER — POTASSIUM CHLORIDE 20 MEQ
20 PACKET (EA) ORAL
Refills: 0 | Status: DISCONTINUED | OUTPATIENT
Start: 2021-08-16 | End: 2021-08-16

## 2021-08-16 RX ORDER — DEXTROSE MONOHYDRATE, SODIUM CHLORIDE, AND POTASSIUM CHLORIDE 50; .745; 4.5 G/1000ML; G/1000ML; G/1000ML
1000 INJECTION, SOLUTION INTRAVENOUS
Refills: 0 | Status: DISCONTINUED | OUTPATIENT
Start: 2021-08-16 | End: 2021-08-21

## 2021-08-16 RX ORDER — LIDOCAINE 4 G/100G
15 CREAM TOPICAL
Refills: 0 | Status: DISCONTINUED | OUTPATIENT
Start: 2021-08-16 | End: 2021-08-21

## 2021-08-16 RX ORDER — MAGNESIUM SULFATE 500 MG/ML
2 VIAL (ML) INJECTION ONCE
Refills: 0 | Status: COMPLETED | OUTPATIENT
Start: 2021-08-16 | End: 2021-08-16

## 2021-08-16 RX ADMIN — Medication 100 MILLIEQUIVALENT(S): at 16:56

## 2021-08-16 RX ADMIN — Medication 100 MILLIEQUIVALENT(S): at 13:50

## 2021-08-16 RX ADMIN — HEPARIN SODIUM 5000 UNIT(S): 5000 INJECTION INTRAVENOUS; SUBCUTANEOUS at 15:25

## 2021-08-16 RX ADMIN — Medication 100 MILLIEQUIVALENT(S): at 15:24

## 2021-08-16 RX ADMIN — Medication 50 GRAM(S): at 13:49

## 2021-08-16 RX ADMIN — HEPARIN SODIUM 5000 UNIT(S): 5000 INJECTION INTRAVENOUS; SUBCUTANEOUS at 05:16

## 2021-08-16 RX ADMIN — SODIUM CHLORIDE 150 MILLILITER(S): 9 INJECTION, SOLUTION INTRAVENOUS at 05:16

## 2021-08-16 RX ADMIN — HEPARIN SODIUM 5000 UNIT(S): 5000 INJECTION INTRAVENOUS; SUBCUTANEOUS at 21:57

## 2021-08-16 RX ADMIN — DEXTROSE MONOHYDRATE, SODIUM CHLORIDE, AND POTASSIUM CHLORIDE 80 MILLILITER(S): 50; .745; 4.5 INJECTION, SOLUTION INTRAVENOUS at 13:50

## 2021-08-16 NOTE — DIETITIAN INITIAL EVALUATION ADULT. - OTHER INFO
Patient denies GI distress (nausea/vomiting/diarrhea/constipation), last BM 4 days ago per patient. Patient presents with difficulty chewing/swallowing secondary to severe radiation pharyngitis. Endorses weight loss since initiation of chemotherapy. Patient states his previous usual body weight was ~77kg, per HIE patient weighed this amount on 5/7/21. Patient's current dosing weight of 52.9kg indicates ~24kg weight loss x 3 months.

## 2021-08-16 NOTE — DIETITIAN NUTRITION RISK NOTIFICATION - TREATMENT: THE FOLLOWING DIET HAS BEEN RECOMMENDED
Diet, NPO after Midnight:      NPO Start Date: 15-Aug-2021,   NPO Start Time: 23:59 (08-15-21 @ 17:16) [Active]  Diet, Clear Liquid (08-13-21 @ 21:06) [Available for Activation]  Diet, NPO (08-13-21 @ 21:06) [Active]

## 2021-08-16 NOTE — PROGRESS NOTE ADULT - SUBJECTIVE AND OBJECTIVE BOX
Patient is a 71y old  Male who presents with a chief complaint of odynophagia (16 Aug 2021 12:03)        SUBJECTIVE / OVERNIGHT EVENTS:      MEDICATIONS  (STANDING):  dextrose 5% + sodium chloride 0.45% with potassium chloride 20 mEq/L 1000 milliLiter(s) (80 mL/Hr) IV Continuous <Continuous>  heparin   Injectable 5000 Unit(s) SubCutaneous every 8 hours  magnesium sulfate  IVPB 2 Gram(s) IV Intermittent once  potassium chloride  10 mEq/100 mL IVPB 10 milliEquivalent(s) IV Intermittent every 1 hour    MEDICATIONS  (PRN):  aluminum hydroxide/magnesium hydroxide/simethicone Suspension 30 milliLiter(s) Oral every 4 hours PRN Dyspepsia  benzocaine 15 mG/menthol 3.6 mG (Sugar-Free) Lozenge 1 Lozenge Oral every 2 hours PRN Sore Throat  melatonin 3 milliGRAM(s) Oral at bedtime PRN Insomnia  ondansetron Injectable 4 milliGRAM(s) IV Push every 8 hours PRN Nausea and/or Vomiting      Vital Signs Last 24 Hrs  T(C): 36.8 (16 Aug 2021 05:13), Max: 37.1 (15 Aug 2021 17:15)  T(F): 98.3 (16 Aug 2021 05:13), Max: 98.7 (15 Aug 2021 17:15)  HR: 64 (16 Aug 2021 05:13) (59 - 67)  BP: 131/71 (16 Aug 2021 05:13) (121/76 - 145/80)  BP(mean): --  RR: 17 (16 Aug 2021 05:13) (16 - 17)  SpO2: 100% (16 Aug 2021 05:13) (100% - 100%)  CAPILLARY BLOOD GLUCOSE        I&O's Summary        PHYSICAL EXAM  GENERAL: NAD, well-developed  HEAD:  Atraumatic, Normocephalic  EYES: EOMI, PERRLA, conjunctiva and sclera clear  NECK: Supple, No JVD  CHEST/LUNG: Clear to auscultation bilaterally; No wheeze  HEART: Regular rate and rhythm; No murmurs, rubs, or gallops  ABDOMEN: Soft, Nontender, Nondistended; Bowel sounds present  EXTREMITIES:  2+ Peripheral Pulses, No clubbing, cyanosis, or edema  PSYCH: AAOx3  SKIN: No rashes or lesions    LABS:                        8.3    1.64  )-----------( 211      ( 16 Aug 2021 07:33 )             24.8     08-16    144  |  109<H>  |  14  ----------------------------<  118<H>  3.2<L>   |  25  |  1.21    Ca    8.6      16 Aug 2021 10:06  Phos  2.7     08-16  Mg     1.30     08-16                RADIOLOGY & ADDITIONAL TESTS:    Imaging Personally Reviewed:  Consultant(s) Notes Reviewed:    Care Discussed with Consultants/Other Providers:   Patient is a 71y old  Male who presents with a chief complaint of odynophagia (16 Aug 2021 12:03)    SUBJECTIVE / OVERNIGHT EVENTS:  PEG tube placement postponed by thoracic surgery. Pt continues to have pain with swallowing. No fever, chills, chest pain, SOB, n/v or abdominal pain    MEDICATIONS  (STANDING):  dextrose 5% + sodium chloride 0.45% with potassium chloride 20 mEq/L 1000 milliLiter(s) (80 mL/Hr) IV Continuous <Continuous>  heparin   Injectable 5000 Unit(s) SubCutaneous every 8 hours  magnesium sulfate  IVPB 2 Gram(s) IV Intermittent once  potassium chloride  10 mEq/100 mL IVPB 10 milliEquivalent(s) IV Intermittent every 1 hour    MEDICATIONS  (PRN):  aluminum hydroxide/magnesium hydroxide/simethicone Suspension 30 milliLiter(s) Oral every 4 hours PRN Dyspepsia  benzocaine 15 mG/menthol 3.6 mG (Sugar-Free) Lozenge 1 Lozenge Oral every 2 hours PRN Sore Throat  melatonin 3 milliGRAM(s) Oral at bedtime PRN Insomnia  ondansetron Injectable 4 milliGRAM(s) IV Push every 8 hours PRN Nausea and/or Vomiting      Vital Signs Last 24 Hrs  T(C): 36.8 (16 Aug 2021 05:13), Max: 37.1 (15 Aug 2021 17:15)  T(F): 98.3 (16 Aug 2021 05:13), Max: 98.7 (15 Aug 2021 17:15)  HR: 64 (16 Aug 2021 05:13) (59 - 67)  BP: 131/71 (16 Aug 2021 05:13) (121/76 - 145/80)  RR: 17 (16 Aug 2021 05:13) (16 - 17)  SpO2: 100% (16 Aug 2021 05:13) (100% - 100%)        PHYSICAL EXAM  GENERAL: NAD, on RA, thin framed  MOUTH: superficial ulcerations noted on hard palate  NECK: skin hyperpigmentation and desquamation and scabs noted externally  CHEST/LUNG: Clear to auscultation bilaterally; No wheeze  HEART: Regular rate and rhythm; No murmurs, rubs, or gallops  ABDOMEN: Soft, Nontender, Nondistended; Bowel sounds present  EXTREMITIES:  ROM intact, No clubbing, cyanosis, or edema  PSYCH: AAOx3      LABS:                        8.3    1.64  )-----------( 211      ( 16 Aug 2021 07:33 )             24.8     08-16    144  |  109<H>  |  14  ----------------------------<  118<H>  3.2<L>   |  25  |  1.21    Ca    8.6      16 Aug 2021 10:06  Phos  2.7     08-16  Mg     1.30     08-16            Consultant(s) Notes Reviewed:  yes  Care Discussed with Consultants/Other Providers:

## 2021-08-16 NOTE — DIETITIAN INITIAL EVALUATION ADULT. - ADD RECOMMEND
1) Monitor tolerance to EN regimen, BM, skin integrity, labs 2) RD remains available, reconsult for adjustment to enteral regimen as necessary

## 2021-08-16 NOTE — PROGRESS NOTE ADULT - ASSESSMENT
71 yr old male with throat cancer presenting with odynophagia 2/2 radiation induced pharyngitis/esophagitis and possible PEG

## 2021-08-16 NOTE — PROGRESS NOTE ADULT - SUBJECTIVE AND OBJECTIVE BOX
THORACIC SURGERY PROGRESS NOTE    Patient was tentatively scheduled for a PEG today but will be cancelled due to Neutropenia.      Subjective: No complaints, no overnight events      PMHx: Throat Cancer s/p Chemo/RT  Social Hx: denies      VITAL SIGNS:  T(C): 36.8 (08-16-21 @ 05:13), Max: 37.1 (08-15-21 @ 17:15)  HR: 64 (08-16-21 @ 05:13) (59 - 67)  BP: 131/71 (08-16-21 @ 05:13) (121/76 - 145/80)  RR: 17 (08-16-21 @ 05:13) (16 - 17)  SpO2: 100% (08-16-21 @ 05:13) (100% - 100%)      LABS:                        8.3    1.64  )-----------( 211      ( 16 Aug 2021 07:33 )             24.8     08-16    142  |  110<H>  |  17  ----------------------------<  107<H>  7.9<HH>   |  24  |  1.27    Ca    2.8<LL>      16 Aug 2021 07:33  Phos  2.9     08-16  Mg     <0.50     08-16      MEDICATIONS:  MEDICATIONS  (STANDING):  dextrose 5% + sodium chloride 0.45%. 1000 milliLiter(s) (150 mL/Hr) IV Continuous <Continuous>  heparin   Injectable 5000 Unit(s) SubCutaneous every 8 hours    MEDICATIONS  (PRN):  aluminum hydroxide/magnesium hydroxide/simethicone Suspension 30 milliLiter(s) Oral every 4 hours PRN Dyspepsia  benzocaine 15 mG/menthol 3.6 mG (Sugar-Free) Lozenge 1 Lozenge Oral every 2 hours PRN Sore Throat  melatonin 3 milliGRAM(s) Oral at bedtime PRN Insomnia  ondansetron Injectable 4 milliGRAM(s) IV Push every 8 hours PRN Nausea and/or Vomiting        PHYSICAL EXAM:   General: well developed, well nourished, NAD  Neuro: awake, alert, responds to commands, nonfocal, FRANKLIN x 4  Eyes: scleras clear b/l, PERRLA/ EOMI, Gross vision intact  ENT: Gross hearing intact, grossly patent pharynx, no stridor  Neck: Neck supple, trachea midline, No JVD  Respiratory: CTA B/L, No wheezing, rales, rhonchi  CV: RRR, +S1 +S2, no murmurs, rubs or gallops  Abdominal: Soft, NT, ND, no rebound, no guarding, + bowel sounds  Extremities: No edema, + peripheral pulses  Skin: No Rashes, Hematoma, Ecchymosis  Lymphatic: No Neck, axilla, groin LAD  Psych: Oriented x 3, normal affect        [id number]

## 2021-08-16 NOTE — DIETITIAN INITIAL EVALUATION ADULT. - PROBLEM SELECTOR PLAN 1
New  3 day history of odynophagia with solids and liquids. pain 8/10  CT neck as above  ENT consulted: airway clear on scope exam, benzocaine lozenges  LR 75ml/hr  Advance diet as tolerated with aspiration precautions   Heme onc/Rad onc consult in AM  GI emailed for possible PEG    Cr 1.7 lwith unknown baseline- monitor with IVF

## 2021-08-16 NOTE — DIETITIAN INITIAL EVALUATION ADULT. - ETIOLOGY
related to inability to swallow solids/liquids, increased nutrient needs in setting of catabolic illness

## 2021-08-16 NOTE — PROVIDER CONTACT NOTE (CRITICAL VALUE NOTIFICATION) - BACKGROUND
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
patient admitted for throat pain
patient admitted for esophogeal pain

## 2021-08-16 NOTE — PROGRESS NOTE ADULT - PROBLEM SELECTOR PLAN 9
Pt chronically anemic and leukopenic due to underlying malignancy.  Today Hb 6.6, down from 8.5 yesterday, asymptomatic. ? lab error, No signs of bleeding.  -Repeat CBC  -If Hb <7, will transfuse PRBC

## 2021-08-16 NOTE — PROGRESS NOTE ADULT - ASSESSMENT
70 y/o male with Throat Cancer admitted with odynophagia in need of PEG placement but now with Neutropenia (wbc 1.64)      1. PEG cancelled today for Neutropenia  2. White count needs to be higher before taking patient for an elective procedure  3. Consider Neupogen?  4. Medical optimization and medical clearance needed before taking patient to OR for PEG placement  5. Tentatively rescheduled for Wednesday 8/18  6. Continue DVT PPx w/ Heparin SC & Venodynes  7. OOB/ambulate        Discussed with Cardiothoracic Team at AM rounds.    Armani Law PA-C, MPAS  Thoracic Surgery   Spectra

## 2021-08-16 NOTE — DIETITIAN INITIAL EVALUATION ADULT. - ENTERAL
When clinically indicated, initiate tube feeds of Jevity 1.2 @ 20mL/hr and advance 10mL/hr q6hr until goal rate of 60mL/hr x 24hr. At goal, regimen provides 1440mL total volume, 1728kcal (33kcal/kg), 80g protein (1.5g/kg) and 1162mL free water. Additional free water flushes per MD discretion.

## 2021-08-16 NOTE — PROVIDER CONTACT NOTE (CRITICAL VALUE NOTIFICATION) - SITUATION
Hgb 6.9 Hct 21.3
Hgb 6.6 Hct 20.3
Lab called with h/h for bhumi, quite low with hemoglobin 6.7, hematocrit 20.5

## 2021-08-16 NOTE — DIETITIAN INITIAL EVALUATION ADULT. - ORAL INTAKE PTA/DIET HISTORY
Patient reports poor intake x5 days PTA secondary to decreasing ability to swallow solids and liquids. Denies use of micronutrient/nutrition supplements at home. Confirms NKFA.

## 2021-08-16 NOTE — PROVIDER CONTACT NOTE (CRITICAL VALUE NOTIFICATION) - ACTION/TREATMENT ORDERED:
please repeat CBC for confirmation, will order new lab
redo cbc labs stat.
will order for blood transfusion, will come get consent from patient

## 2021-08-16 NOTE — DIETITIAN INITIAL EVALUATION ADULT. - PERTINENT MEDS FT
MEDICATIONS  (STANDING):  dextrose 5% + sodium chloride 0.45%. 1000 milliLiter(s) (150 mL/Hr) IV Continuous <Continuous>  heparin   Injectable 5000 Unit(s) SubCutaneous every 8 hours

## 2021-08-16 NOTE — PROVIDER CONTACT NOTE (CRITICAL VALUE NOTIFICATION) - ASSESSMENT
Hgb 6.9 Hct 21.3. Patient remains asymptomatic, however, confirmation shows true anemia
Hgb 6.6 Hct 20.3. Patient is asymptomatic at this time, no signs or symptoms of bleeding present
Lab called with h/h for bhumi, quite low with hemoglobin 6.7, hematocrit 20.5. Patient is asymptomatic and has no complaints of pain or otherwise.

## 2021-08-16 NOTE — DIETITIAN INITIAL EVALUATION ADULT. - CHIEF COMPLAINT
The patient is a 70 y/o male with Throat Cancer admitted with odynophagia in need of PEG placement tentatively scheduled for today 8/16, but cancelled as patient now with Neutropenia (wbc 1.64). PEG tentatively rescheduled for 8/18.

## 2021-08-17 ENCOUNTER — TRANSCRIPTION ENCOUNTER (OUTPATIENT)
Age: 71
End: 2021-08-17

## 2021-08-17 ENCOUNTER — APPOINTMENT (OUTPATIENT)
Dept: INFUSION THERAPY | Facility: HOSPITAL | Age: 71
End: 2021-08-17

## 2021-08-17 LAB
ANION GAP SERPL CALC-SCNC: 11 MMOL/L — SIGNIFICANT CHANGE UP (ref 7–14)
ANION GAP SERPL CALC-SCNC: 13 MMOL/L — SIGNIFICANT CHANGE UP (ref 7–14)
BASOPHILS # BLD AUTO: 0 K/UL — SIGNIFICANT CHANGE UP (ref 0–0.2)
BASOPHILS NFR BLD AUTO: 0 % — SIGNIFICANT CHANGE UP (ref 0–2)
BUN SERPL-MCNC: 12 MG/DL — SIGNIFICANT CHANGE UP (ref 7–23)
BUN SERPL-MCNC: 12 MG/DL — SIGNIFICANT CHANGE UP (ref 7–23)
CALCIUM SERPL-MCNC: 8.4 MG/DL — SIGNIFICANT CHANGE UP (ref 8.4–10.5)
CALCIUM SERPL-MCNC: 8.4 MG/DL — SIGNIFICANT CHANGE UP (ref 8.4–10.5)
CHLORIDE SERPL-SCNC: 105 MMOL/L — SIGNIFICANT CHANGE UP (ref 98–107)
CHLORIDE SERPL-SCNC: 105 MMOL/L — SIGNIFICANT CHANGE UP (ref 98–107)
CO2 SERPL-SCNC: 24 MMOL/L — SIGNIFICANT CHANGE UP (ref 22–31)
CO2 SERPL-SCNC: 24 MMOL/L — SIGNIFICANT CHANGE UP (ref 22–31)
CREAT SERPL-MCNC: 1.17 MG/DL — SIGNIFICANT CHANGE UP (ref 0.5–1.3)
CREAT SERPL-MCNC: 1.19 MG/DL — SIGNIFICANT CHANGE UP (ref 0.5–1.3)
EOSINOPHIL # BLD AUTO: 0.02 K/UL — SIGNIFICANT CHANGE UP (ref 0–0.5)
EOSINOPHIL NFR BLD AUTO: 0.9 % — SIGNIFICANT CHANGE UP (ref 0–6)
GLUCOSE SERPL-MCNC: 91 MG/DL — SIGNIFICANT CHANGE UP (ref 70–99)
GLUCOSE SERPL-MCNC: 91 MG/DL — SIGNIFICANT CHANGE UP (ref 70–99)
HCT VFR BLD CALC: 27.3 % — LOW (ref 39–50)
HGB BLD-MCNC: 9.1 G/DL — LOW (ref 13–17)
IANC: 0.81 K/UL — LOW (ref 1.5–8.5)
LYMPHOCYTES # BLD AUTO: 0.27 K/UL — LOW (ref 1–3.3)
LYMPHOCYTES # BLD AUTO: 11.3 % — LOW (ref 13–44)
MAGNESIUM SERPL-MCNC: 1.5 MG/DL — LOW (ref 1.6–2.6)
MAGNESIUM SERPL-MCNC: 1.6 MG/DL — SIGNIFICANT CHANGE UP (ref 1.6–2.6)
MCHC RBC-ENTMCNC: 31.8 PG — SIGNIFICANT CHANGE UP (ref 27–34)
MCHC RBC-ENTMCNC: 33.3 GM/DL — SIGNIFICANT CHANGE UP (ref 32–36)
MCV RBC AUTO: 95.5 FL — SIGNIFICANT CHANGE UP (ref 80–100)
MONOCYTES # BLD AUTO: 0.49 K/UL — SIGNIFICANT CHANGE UP (ref 0–0.9)
MONOCYTES NFR BLD AUTO: 20.9 % — HIGH (ref 2–14)
NEUTROPHILS # BLD AUTO: 0.96 K/UL — LOW (ref 1.8–7.4)
NEUTROPHILS NFR BLD AUTO: 34.8 % — LOW (ref 43–77)
PHOSPHATE SERPL-MCNC: 2.4 MG/DL — LOW (ref 2.5–4.5)
PHOSPHATE SERPL-MCNC: 3 MG/DL — SIGNIFICANT CHANGE UP (ref 2.5–4.5)
PLATELET # BLD AUTO: 241 K/UL — SIGNIFICANT CHANGE UP (ref 150–400)
POTASSIUM SERPL-MCNC: 3.2 MMOL/L — LOW (ref 3.5–5.3)
POTASSIUM SERPL-MCNC: 3.5 MMOL/L — SIGNIFICANT CHANGE UP (ref 3.5–5.3)
POTASSIUM SERPL-SCNC: 3.2 MMOL/L — LOW (ref 3.5–5.3)
POTASSIUM SERPL-SCNC: 3.5 MMOL/L — SIGNIFICANT CHANGE UP (ref 3.5–5.3)
RBC # BLD: 2.86 M/UL — LOW (ref 4.2–5.8)
RBC # FLD: 14.6 % — HIGH (ref 10.3–14.5)
SARS-COV-2 RNA SPEC QL NAA+PROBE: SIGNIFICANT CHANGE UP
SODIUM SERPL-SCNC: 140 MMOL/L — SIGNIFICANT CHANGE UP (ref 135–145)
SODIUM SERPL-SCNC: 142 MMOL/L — SIGNIFICANT CHANGE UP (ref 135–145)
WBC # BLD: 2.35 K/UL — LOW (ref 3.8–10.5)
WBC # FLD AUTO: 2.35 K/UL — LOW (ref 3.8–10.5)

## 2021-08-17 PROCEDURE — 99232 SBSQ HOSP IP/OBS MODERATE 35: CPT

## 2021-08-17 RX ORDER — POTASSIUM PHOSPHATE, MONOBASIC POTASSIUM PHOSPHATE, DIBASIC 236; 224 MG/ML; MG/ML
15 INJECTION, SOLUTION INTRAVENOUS ONCE
Refills: 0 | Status: COMPLETED | OUTPATIENT
Start: 2021-08-17 | End: 2021-08-17

## 2021-08-17 RX ORDER — FILGRASTIM 480MCG/1.6
300 VIAL (ML) INJECTION ONCE
Refills: 0 | Status: COMPLETED | OUTPATIENT
Start: 2021-08-17 | End: 2021-08-17

## 2021-08-17 RX ORDER — POTASSIUM CHLORIDE 20 MEQ
10 PACKET (EA) ORAL
Refills: 0 | Status: COMPLETED | OUTPATIENT
Start: 2021-08-17 | End: 2021-08-17

## 2021-08-17 RX ORDER — MAGNESIUM SULFATE 500 MG/ML
2 VIAL (ML) INJECTION ONCE
Refills: 0 | Status: COMPLETED | OUTPATIENT
Start: 2021-08-17 | End: 2021-08-17

## 2021-08-17 RX ADMIN — Medication 300 MICROGRAM(S): at 16:31

## 2021-08-17 RX ADMIN — HEPARIN SODIUM 5000 UNIT(S): 5000 INJECTION INTRAVENOUS; SUBCUTANEOUS at 21:45

## 2021-08-17 RX ADMIN — Medication 100 MILLIEQUIVALENT(S): at 17:22

## 2021-08-17 RX ADMIN — HEPARIN SODIUM 5000 UNIT(S): 5000 INJECTION INTRAVENOUS; SUBCUTANEOUS at 05:27

## 2021-08-17 RX ADMIN — POTASSIUM PHOSPHATE, MONOBASIC POTASSIUM PHOSPHATE, DIBASIC 62.5 MILLIMOLE(S): 236; 224 INJECTION, SOLUTION INTRAVENOUS at 13:11

## 2021-08-17 RX ADMIN — Medication 50 GRAM(S): at 12:03

## 2021-08-17 RX ADMIN — Medication 100 MILLIEQUIVALENT(S): at 12:02

## 2021-08-17 RX ADMIN — DEXTROSE MONOHYDRATE, SODIUM CHLORIDE, AND POTASSIUM CHLORIDE 80 MILLILITER(S): 50; .745; 4.5 INJECTION, SOLUTION INTRAVENOUS at 01:44

## 2021-08-17 RX ADMIN — HEPARIN SODIUM 5000 UNIT(S): 5000 INJECTION INTRAVENOUS; SUBCUTANEOUS at 13:12

## 2021-08-17 RX ADMIN — Medication 100 MILLIEQUIVALENT(S): at 13:06

## 2021-08-17 RX ADMIN — LIDOCAINE 15 MILLILITER(S): 4 CREAM TOPICAL at 16:31

## 2021-08-17 RX ADMIN — DEXTROSE MONOHYDRATE, SODIUM CHLORIDE, AND POTASSIUM CHLORIDE 80 MILLILITER(S): 50; .745; 4.5 INJECTION, SOLUTION INTRAVENOUS at 20:34

## 2021-08-17 NOTE — PROGRESS NOTE ADULT - ATTENDING COMMENTS
Patient awaiting PEG, now with neutropenia due to chemotherapy 10 days ago. Neupogen today, await ANC improvement. Trend CBC.

## 2021-08-17 NOTE — PROGRESS NOTE ADULT - ASSESSMENT
71 yr old male with throat cancer presenting with odynophagia 2/2 radiation induced pharyngitis/esophagitis admitted for PEG tube placement in the setting of severe protein calorie malnutrition

## 2021-08-17 NOTE — PROGRESS NOTE ADULT - SUBJECTIVE AND OBJECTIVE BOX
Patient is a 71y old  Male who presents with a chief complaint of odynophagia (17 Aug 2021 10:59)      SUBJECTIVE / OVERNIGHT EVENTS:  Patient is doing okay. Pt has been self suctioning his mouth. No fever, chills, chest pain, SOB, n/v or abdominal pain. Pt's wife present at bedside.     MEDICATIONS  (STANDING):  dextrose 5% + sodium chloride 0.45% with potassium chloride 20 mEq/L 1000 milliLiter(s) (80 mL/Hr) IV Continuous <Continuous>  heparin   Injectable 5000 Unit(s) SubCutaneous every 8 hours    MEDICATIONS  (PRN):  aluminum hydroxide/magnesium hydroxide/simethicone Suspension 30 milliLiter(s) Oral every 4 hours PRN Dyspepsia  benzocaine 15 mG/menthol 3.6 mG (Sugar-Free) Lozenge 1 Lozenge Oral every 2 hours PRN Sore Throat  lidocaine 2% Viscous 15 milliLiter(s) Swish and Spit every 3 hours PRN Mouth Care  melatonin 3 milliGRAM(s) Oral at bedtime PRN Insomnia  ondansetron Injectable 4 milliGRAM(s) IV Push every 8 hours PRN Nausea and/or Vomiting      Vital Signs Last 24 Hrs  T(C): 36.8 (17 Aug 2021 15:33), Max: 36.9 (17 Aug 2021 05:32)  T(F): 98.2 (17 Aug 2021 15:33), Max: 98.4 (17 Aug 2021 05:32)  HR: 65 (17 Aug 2021 15:33) (65 - 66)  BP: 141/80 (17 Aug 2021 15:33) (127/73 - 141/80)  RR: 18 (17 Aug 2021 15:33) (18 - 18)  SpO2: 100% (17 Aug 2021 15:33) (100% - 100%)          PHYSICAL EXAM  GENERAL: NAD, on RA, thin framed  MOUTH: superficial ulcerations noted on hard palate  NECK: skin hyperpigmentation and desquamation and scabs noted externally  CHEST/LUNG: Clear to auscultation bilaterally; No wheeze  HEART: Regular rate and rhythm; No murmurs, rubs, or gallops  ABDOMEN: Soft, Nontender, Nondistended; Bowel sounds present  EXTREMITIES:  ROM intact, No clubbing, cyanosis, or edema  PSYCH: AAOx3          LABS:                        9.1    2.35  )-----------( 241      ( 17 Aug 2021 07:15 )             27.3     08-17    142  |  105  |  12  ----------------------------<  91  3.2<L>   |  24  |  1.19    Ca    8.4      17 Aug 2021 07:15  Phos  2.4     08-17  Mg     1.50     08-17              Consultant(s) Notes Reviewed:  yes  Care Discussed with Consultants/Other Providers:

## 2021-08-17 NOTE — PROGRESS NOTE ADULT - ASSESSMENT
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  - pain control per primary team; can consider palliative consult for help with pain  - recommend nutrition consult  - CT surgery consulted for PEG placement, postponed to 08/18 for leukopenia  - leukopenia likely 2/2 recent chemo, will give neupogen x1 08/17 as patient did not receive GCSF with last chemo   - trend CBC with diff daily    Tonsillar squamous cell carcinoma  - T2,N1; Stage I  - p16+  - on concurrent chemoRT with cisplatin -> carboplatin/paclitaxel after C5 2/2 ZORAIDA  - finished carbo/paclitaxel 08/06 x2 cycles  - has two fractions RT remaining but per outpatient discussion with rad onc Dr. 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  - pain control per primary team; can consider palliative consult for help with pain  - recommend nutrition consult  - CT surgery consulted for PEG placement, postponed to 08/18 for leukopenia/neutropenia  - leukopenia likely 2/2 recent chemo, will give neupogen x1 08/17 as patient did not receive GCSF with last chemo   - trend CBC with diff daily, to consider additional dose tomorrow if ANC remains low    Tonsillar squamous cell carcinoma  - T2,N1; Stage I  - p16+  - on concurrent chemoRT with cisplatin -> carboplatin/paclitaxel after C5 2/2 ZORAIDA  - finished carbo/paclitaxel 08/06 x2 cycles  - has two fractions RT remaining but per outpatient discussion with rad onc Dr. Arias will conclude RT with what he has received thus far  - f/u with Dr Gramajo at [facility]          Mono Onofre MD  Hematology/Oncology Fellow

## 2021-08-17 NOTE — PROGRESS NOTE ADULT - SUBJECTIVE AND OBJECTIVE BOX
Subjective: Seen at bedside. Anxious to have PEG plmt to receive "nutrition".    Vital Signs:  Vital Signs Last 24 Hrs  T(C): 36.9 (08-17-21 @ 05:32), Max: 36.9 (08-17-21 @ 05:32)  T(F): 98.4 (08-17-21 @ 05:32), Max: 98.4 (08-17-21 @ 05:32)  HR: 66 (08-17-21 @ 05:32) (66 - 72)  BP: 127/73 (08-17-21 @ 05:32) (127/73 - 129/74)  RR: 18 (08-17-21 @ 05:32) (17 - 18)  SpO2: 100% (08-17-21 @ 05:32) (100% - 100%) on (O2)    Telemetry/Alarms:  General: cachectic, NAD  Neurology: Awake, nonfocal, FRANKLIN x 4  Eyes: Scleras clear, PERRLA/ EOMI, Gross vision intact  ENT: Aphasia. Hyperpigmentation of the throat.  Neck: Neck supple, trachea midline, No JVD,   Respiratory: CTA B/L, No wheezing, rales, rhonchi  CV: RRR, S1S2, no murmurs, rubs or gallops  Abdominal: Soft, NT, ND +BS,   Extremities: No edema, + peripheral pulses  Psych: Oriented x 3, normal affect  Incisions:   Tubes:  Relevant labs, radiology and Medications reviewed                        9.1    2.35  )-----------( 241      ( 17 Aug 2021 07:15 )             27.3     08-17    142  |  105  |  12  ----------------------------<  91  3.2<L>   |  24  |  1.19    Ca    8.4      17 Aug 2021 07:15  Phos  2.4     08-17  Mg     1.50     08-17        ABG:  CXR:  MEDICATIONS  (STANDING):  dextrose 5% + sodium chloride 0.45% with potassium chloride 20 mEq/L 1000 milliLiter(s) (80 mL/Hr) IV Continuous <Continuous>  filgrastim-sndz (ZARXIO) Injectable 300 MICROGram(s) SubCutaneous once  heparin   Injectable 5000 Unit(s) SubCutaneous every 8 hours  magnesium sulfate  IVPB 2 Gram(s) IV Intermittent once  potassium chloride  10 mEq/100 mL IVPB 10 milliEquivalent(s) IV Intermittent every 1 hour  potassium phosphate IVPB 15 milliMole(s) IV Intermittent once    MEDICATIONS  (PRN):  aluminum hydroxide/magnesium hydroxide/simethicone Suspension 30 milliLiter(s) Oral every 4 hours PRN Dyspepsia  benzocaine 15 mG/menthol 3.6 mG (Sugar-Free) Lozenge 1 Lozenge Oral every 2 hours PRN Sore Throat  lidocaine 2% Viscous 15 milliLiter(s) Swish and Spit every 3 hours PRN Mouth Care  melatonin 3 milliGRAM(s) Oral at bedtime PRN Insomnia  ondansetron Injectable 4 milliGRAM(s) IV Push every 8 hours PRN Nausea and/or Vomiting    Pertinent Physical Exam  I&O's Summary      Assessment  71y Male  w/ PAST MEDICAL & SURGICAL HISTORY:  Oropharynx cancer    No significant past surgical history    admitted with complaints of Patient is a 71y old  Male who presents with a chief complaint of odynophagia (16 Aug 2021 12:45).      PLAN  PEG plmt postponed due to leukopenia.  Would obtain Oncology consult.  Pulm: Encourage coughing, deep breathing and use of incentive spirometry. Wean off supplemental oxygen as able.  Cardio: Monitor telemetry/alarms  GI: NPO. Cont IVF/supplements  Will d/w Dr. Peñaloza.

## 2021-08-17 NOTE — PROGRESS NOTE ADULT - PROBLEM SELECTOR PLAN 8
Pt chronically anemic and leukopenic due to underlying malignancy.  Today Hb 6.6, down from 8.5 yesterday, asymptomatic. ? lab error, No signs of bleeding.  -Repeat CBC  -If Hb <7, will transfuse PRBC
SQ Heparin

## 2021-08-18 ENCOUNTER — APPOINTMENT (OUTPATIENT)
Dept: THORACIC SURGERY | Facility: HOSPITAL | Age: 71
End: 2021-08-18
Payer: MEDICARE

## 2021-08-18 LAB
ANION GAP SERPL CALC-SCNC: 11 MMOL/L — SIGNIFICANT CHANGE UP (ref 7–14)
APTT BLD: 34 SEC — SIGNIFICANT CHANGE UP (ref 27–36.3)
BASOPHILS # BLD AUTO: 0.04 K/UL — SIGNIFICANT CHANGE UP (ref 0–0.2)
BASOPHILS NFR BLD AUTO: 0.7 % — SIGNIFICANT CHANGE UP (ref 0–2)
BLD GP AB SCN SERPL QL: NEGATIVE — SIGNIFICANT CHANGE UP
BUN SERPL-MCNC: 11 MG/DL — SIGNIFICANT CHANGE UP (ref 7–23)
CALCIUM SERPL-MCNC: 8.3 MG/DL — LOW (ref 8.4–10.5)
CHLORIDE SERPL-SCNC: 103 MMOL/L — SIGNIFICANT CHANGE UP (ref 98–107)
CO2 SERPL-SCNC: 23 MMOL/L — SIGNIFICANT CHANGE UP (ref 22–31)
CREAT SERPL-MCNC: 1.19 MG/DL — SIGNIFICANT CHANGE UP (ref 0.5–1.3)
EOSINOPHIL # BLD AUTO: 0.01 K/UL — SIGNIFICANT CHANGE UP (ref 0–0.5)
EOSINOPHIL NFR BLD AUTO: 0.2 % — SIGNIFICANT CHANGE UP (ref 0–6)
GLUCOSE SERPL-MCNC: 87 MG/DL — SIGNIFICANT CHANGE UP (ref 70–99)
HCT VFR BLD CALC: 24.6 % — LOW (ref 39–50)
HGB BLD-MCNC: 8.1 G/DL — LOW (ref 13–17)
IANC: 4.05 K/UL — SIGNIFICANT CHANGE UP (ref 1.5–8.5)
IMM GRANULOCYTES NFR BLD AUTO: 8.7 % — HIGH (ref 0–1.5)
INR BLD: 1.28 RATIO — HIGH (ref 0.88–1.16)
LYMPHOCYTES # BLD AUTO: 0.45 K/UL — LOW (ref 1–3.3)
LYMPHOCYTES # BLD AUTO: 7.4 % — LOW (ref 13–44)
MAGNESIUM SERPL-MCNC: 1.4 MG/DL — LOW (ref 1.6–2.6)
MCHC RBC-ENTMCNC: 31.2 PG — SIGNIFICANT CHANGE UP (ref 27–34)
MCHC RBC-ENTMCNC: 32.9 GM/DL — SIGNIFICANT CHANGE UP (ref 32–36)
MCV RBC AUTO: 94.6 FL — SIGNIFICANT CHANGE UP (ref 80–100)
MONOCYTES # BLD AUTO: 0.98 K/UL — HIGH (ref 0–0.9)
MONOCYTES NFR BLD AUTO: 16.2 % — HIGH (ref 2–14)
NEUTROPHILS # BLD AUTO: 4.05 K/UL — SIGNIFICANT CHANGE UP (ref 1.8–7.4)
NEUTROPHILS NFR BLD AUTO: 66.8 % — SIGNIFICANT CHANGE UP (ref 43–77)
NRBC # BLD: 0 /100 WBCS — SIGNIFICANT CHANGE UP
NRBC # FLD: 0.04 K/UL — HIGH
PHOSPHATE SERPL-MCNC: 2.3 MG/DL — LOW (ref 2.5–4.5)
PLATELET # BLD AUTO: 215 K/UL — SIGNIFICANT CHANGE UP (ref 150–400)
POTASSIUM SERPL-MCNC: 3.9 MMOL/L — SIGNIFICANT CHANGE UP (ref 3.5–5.3)
POTASSIUM SERPL-SCNC: 3.9 MMOL/L — SIGNIFICANT CHANGE UP (ref 3.5–5.3)
PROTHROM AB SERPL-ACNC: 14.4 SEC — HIGH (ref 10.6–13.6)
RBC # BLD: 2.6 M/UL — LOW (ref 4.2–5.8)
RBC # FLD: 14.3 % — SIGNIFICANT CHANGE UP (ref 10.3–14.5)
RH IG SCN BLD-IMP: POSITIVE — SIGNIFICANT CHANGE UP
SODIUM SERPL-SCNC: 137 MMOL/L — SIGNIFICANT CHANGE UP (ref 135–145)
WBC # BLD: 6.06 K/UL — SIGNIFICANT CHANGE UP (ref 3.8–10.5)
WBC # FLD AUTO: 6.06 K/UL — SIGNIFICANT CHANGE UP (ref 3.8–10.5)

## 2021-08-18 PROCEDURE — 43246 EGD PLACE GASTROSTOMY TUBE: CPT | Mod: 52

## 2021-08-18 PROCEDURE — 99232 SBSQ HOSP IP/OBS MODERATE 35: CPT

## 2021-08-18 RX ORDER — POTASSIUM PHOSPHATE, MONOBASIC POTASSIUM PHOSPHATE, DIBASIC 236; 224 MG/ML; MG/ML
15 INJECTION, SOLUTION INTRAVENOUS ONCE
Refills: 0 | Status: COMPLETED | OUTPATIENT
Start: 2021-08-18 | End: 2021-08-18

## 2021-08-18 RX ORDER — HYDROMORPHONE HYDROCHLORIDE 2 MG/ML
0.5 INJECTION INTRAMUSCULAR; INTRAVENOUS; SUBCUTANEOUS
Refills: 0 | Status: DISCONTINUED | OUTPATIENT
Start: 2021-08-18 | End: 2021-08-18

## 2021-08-18 RX ORDER — POTASSIUM CHLORIDE 20 MEQ
20 PACKET (EA) ORAL ONCE
Refills: 0 | Status: DISCONTINUED | OUTPATIENT
Start: 2021-08-18 | End: 2021-08-18

## 2021-08-18 RX ORDER — HYDROMORPHONE HYDROCHLORIDE 2 MG/ML
0.5 INJECTION INTRAMUSCULAR; INTRAVENOUS; SUBCUTANEOUS EVERY 4 HOURS
Refills: 0 | Status: DISCONTINUED | OUTPATIENT
Start: 2021-08-18 | End: 2021-08-19

## 2021-08-18 RX ORDER — MAGNESIUM SULFATE 500 MG/ML
2 VIAL (ML) INJECTION ONCE
Refills: 0 | Status: COMPLETED | OUTPATIENT
Start: 2021-08-18 | End: 2021-08-18

## 2021-08-18 RX ORDER — POTASSIUM CHLORIDE 20 MEQ
10 PACKET (EA) ORAL
Refills: 0 | Status: COMPLETED | OUTPATIENT
Start: 2021-08-18 | End: 2021-08-18

## 2021-08-18 RX ADMIN — Medication 100 MILLIEQUIVALENT(S): at 04:57

## 2021-08-18 RX ADMIN — POTASSIUM PHOSPHATE, MONOBASIC POTASSIUM PHOSPHATE, DIBASIC 62.5 MILLIMOLE(S): 236; 224 INJECTION, SOLUTION INTRAVENOUS at 15:27

## 2021-08-18 RX ADMIN — Medication 100 MILLIEQUIVALENT(S): at 02:59

## 2021-08-18 RX ADMIN — HYDROMORPHONE HYDROCHLORIDE 0.5 MILLIGRAM(S): 2 INJECTION INTRAMUSCULAR; INTRAVENOUS; SUBCUTANEOUS at 14:27

## 2021-08-18 RX ADMIN — Medication 100 MILLIEQUIVALENT(S): at 01:38

## 2021-08-18 RX ADMIN — HEPARIN SODIUM 5000 UNIT(S): 5000 INJECTION INTRAVENOUS; SUBCUTANEOUS at 21:23

## 2021-08-18 RX ADMIN — Medication 50 GRAM(S): at 14:27

## 2021-08-18 RX ADMIN — DEXTROSE MONOHYDRATE, SODIUM CHLORIDE, AND POTASSIUM CHLORIDE 80 MILLILITER(S): 50; .745; 4.5 INJECTION, SOLUTION INTRAVENOUS at 17:52

## 2021-08-18 RX ADMIN — HYDROMORPHONE HYDROCHLORIDE 0.5 MILLIGRAM(S): 2 INJECTION INTRAMUSCULAR; INTRAVENOUS; SUBCUTANEOUS at 18:41

## 2021-08-18 RX ADMIN — ONDANSETRON 4 MILLIGRAM(S): 8 TABLET, FILM COATED ORAL at 10:47

## 2021-08-18 RX ADMIN — HYDROMORPHONE HYDROCHLORIDE 0.5 MILLIGRAM(S): 2 INJECTION INTRAMUSCULAR; INTRAVENOUS; SUBCUTANEOUS at 14:42

## 2021-08-18 RX ADMIN — HYDROMORPHONE HYDROCHLORIDE 0.5 MILLIGRAM(S): 2 INJECTION INTRAMUSCULAR; INTRAVENOUS; SUBCUTANEOUS at 10:44

## 2021-08-18 RX ADMIN — Medication 50 GRAM(S): at 07:49

## 2021-08-18 NOTE — CHART NOTE - NSCHARTNOTEFT_GEN_A_CORE
Patient s/p EGD, PEG tube placement.  Peg site with dressing clean and dry.  Peg tube connected to gravity drainage with bilious drainage noted.    Vital Signs Last 24 Hrs  T(C): 37.2 (18 Aug 2021 12:56), Max: 37.2 (18 Aug 2021 12:56)  T(F): 98.9 (18 Aug 2021 12:56), Max: 98.9 (18 Aug 2021 12:56)  HR: 68 (18 Aug 2021 12:56) (60 - 88)  BP: 162/78 (18 Aug 2021 12:56) (106/83 - 193/80)  BP(mean): 85 (18 Aug 2021 11:45) (85 - 112)  RR: 17 (18 Aug 2021 12:56) (13 - 18)  SpO2: 99% (18 Aug 2021 12:56) (99% - 100%)    I&O's Detail    MEDICATIONS  (STANDING):  dextrose 5% + sodium chloride 0.45% with potassium chloride 20 mEq/L 1000 milliLiter(s) (80 mL/Hr) IV Continuous <Continuous>  heparin   Injectable 5000 Unit(s) SubCutaneous every 8 hours    A/P: S/P EGD/ PEG tube placement   Continue PEG tube to gravity drainage   May use for feeds in 24 hours  Will loosen hub of PEG in 48 hours   Pain management   Continue care as per primary team Patient s/p EGD, PEG tube placement.  Peg site with dressing clean and dry.  Peg tube connected to gravity drainage with bilious drainage noted.    Vital Signs Last 24 Hrs  T(C): 37.2 (18 Aug 2021 12:56), Max: 37.2 (18 Aug 2021 12:56)  T(F): 98.9 (18 Aug 2021 12:56), Max: 98.9 (18 Aug 2021 12:56)  HR: 68 (18 Aug 2021 12:56) (60 - 88)  BP: 162/78 (18 Aug 2021 12:56) (106/83 - 193/80)  BP(mean): 85 (18 Aug 2021 11:45) (85 - 112)  RR: 17 (18 Aug 2021 12:56) (13 - 18)  SpO2: 99% (18 Aug 2021 12:56) (99% - 100%)    I&O's Detail    MEDICATIONS  (STANDING):  dextrose 5% + sodium chloride 0.45% with potassium chloride 20 mEq/L 1000 milliLiter(s) (80 mL/Hr) IV Continuous <Continuous>  heparin   Injectable 5000 Unit(s) SubCutaneous every 8 hours    A/P: S/P EGD/ PEG tube placement   Continue PEG tube to gravity drainage   May use for feeds after 9;30 am 8/19/21  Will loosen hub of PEG in 48 hours   Pain management   Continue care as per primary team

## 2021-08-18 NOTE — CHART NOTE - NSCHARTNOTEFT_GEN_A_CORE
Diet, NPO after Midnight:      NPO Start Date: 17-Aug-2021,   NPO Start Time: 23:59 (08-17-21 @ 20:28)  Diet, Full Liquid (08-13-21 @ 21:06)  Diet, Clear Liquid (08-13-21 @ 21:06)  Diet, NPO (08-13-21 @ 21:06)    RDN contacted by provider for bolus feeding recommendation now that pt is s/p PEG today. Previous TF recommendation was for Jevity 1.2 @ 60 ml/hr x 24 hrs, 1440 ml total volume. This translates to Jevity 1.2, 360 ml bolus, every 6 hrs, for same 1440 ml total volume. Will still provide 1728 kcal, 80 gm protein, 1162 ml free H2O/day. RDN services to remain available as needed.

## 2021-08-18 NOTE — PROGRESS NOTE ADULT - ASSESSMENT
71 yr old male with throat cancer presenting with odynophagia 2/2 radiation induced pharyngitis/esophagitis admitted for PEG tube placement in the setting of severe protein calorie malnutrition. Pt s/p PEG tube placement by thoracic surgery today.

## 2021-08-18 NOTE — ASU PREOP CHECKLIST - ADVANCE DIRECTIVE ADDRESSED/READDRESSED
Department of Anesthesiology  Postprocedure Note    Patient: Kalli Duron  MRN: [id number]  YOB: 1971  Date of evaluation: 11/17/2020  Time:  10:05 AM     Procedure Summary     Date:  11/17/20 Room / Location:  [address]    Anesthesia Start:  3423 Anesthesia Stop:      Procedure:  LAPAROSCOPIC CHOLECYSTECTOMY WITH CHOLANGIOGRAM WITH UMBILICAL HERNIA REPAIR (N/A Abdomen) Diagnosis:  (CHOLECYSTITIS WITH CHOLELITHIASIS)    Surgeon:  Geoffrey Paulson MD Responsible Provider:  Trinidad Andujar APRN - CRNA    Anesthesia Type:  general ASA Status:  3          Anesthesia Type: general    Nichole Phase I:      Nichole Phase II:      Last vitals: Reviewed and per EMR flowsheets. Anesthesia Post Evaluation    Patient location during evaluation: PACU  Patient participation: waiting for patient participation  Level of consciousness: awake and lethargic  Pain score: 0  Airway patency: patent  Nausea & Vomiting: no nausea and no vomiting  Complications: no  Cardiovascular status: blood pressure returned to baseline  Respiratory status: oral airway  Hydration status: euvolemic  Comments: Pt transported with oxygen.   Report to RN done

## 2021-08-18 NOTE — PROGRESS NOTE ADULT - SUBJECTIVE AND OBJECTIVE BOX
Patient is a 71y old  Male who presents with a chief complaint of odynophagia (17 Aug 2021 18:01)      SUBJECTIVE / OVERNIGHT EVENTS:  Patient evaluated after getting PEG tube placement. Pt tolerated procedure well. Pt with 8 out of 10 pain on the pain scale at PEG tube site. No fever, chills, chest pain, SOB, n/v.     MEDICATIONS  (STANDING):  dextrose 5% + sodium chloride 0.45% with potassium chloride 20 mEq/L 1000 milliLiter(s) (80 mL/Hr) IV Continuous <Continuous>  heparin   Injectable 5000 Unit(s) SubCutaneous every 8 hours    MEDICATIONS  (PRN):  aluminum hydroxide/magnesium hydroxide/simethicone Suspension 30 milliLiter(s) Oral every 4 hours PRN Dyspepsia  benzocaine 15 mG/menthol 3.6 mG (Sugar-Free) Lozenge 1 Lozenge Oral every 2 hours PRN Sore Throat  HYDROmorphone  Injectable 0.5 milliGRAM(s) IV Push every 4 hours PRN Severe Pain (7 - 10)  lidocaine 2% Viscous 15 milliLiter(s) Swish and Spit every 3 hours PRN Mouth Care  melatonin 3 milliGRAM(s) Oral at bedtime PRN Insomnia  ondansetron Injectable 4 milliGRAM(s) IV Push every 8 hours PRN Nausea and/or Vomiting      Vital Signs Last 24 Hrs  T(C): 37.2 (18 Aug 2021 12:56), Max: 37.2 (18 Aug 2021 12:56)  T(F): 98.9 (18 Aug 2021 12:56), Max: 98.9 (18 Aug 2021 12:56)  HR: 68 (18 Aug 2021 12:56) (60 - 88)  BP: 162/78 (18 Aug 2021 12:56) (106/83 - 193/80)  BP(mean): 85 (18 Aug 2021 11:45) (85 - 112)  RR: 17 (18 Aug 2021 12:56) (13 - 18)  SpO2: 99% (18 Aug 2021 12:56) (99% - 100%)          PHYSICAL EXAM  GENERAL: NAD, on RA, thin framed  MOUTH: superficial ulcerations noted on hard palate  NECK: skin hyperpigmentation, desquamation and scabs noted externally  CHEST/LUNG: Clear to auscultation bilaterally; No wheeze  HEART: Regular rate and rhythm;   ABDOMEN: Soft, Nontender, Nondistended; +PEG tube in place, dressing c/d/i  EXTREMITIES:  ROM intact, No clubbing, cyanosis, or edema  PSYCH: AAOx3        LABS:                        8.1    6.06  )-----------( 215      ( 18 Aug 2021 06:21 )             24.6     08-18    137  |  103  |  11  ----------------------------<  87  3.9   |  23  |  1.19    Ca    8.3<L>      18 Aug 2021 06:21  Phos  2.3     08-18  Mg     1.40     08-18      PT/INR - ( 18 Aug 2021 06:21 )   PT: 14.4 sec;   INR: 1.28 ratio         PTT - ( 18 Aug 2021 06:21 )  PTT:34.0 sec          Consultant(s) Notes Reviewed:  yes  Care Discussed with Consultants/Other Providers:

## 2021-08-19 LAB
ANION GAP SERPL CALC-SCNC: 1 MMOL/L — LOW (ref 7–14)
BUN SERPL-MCNC: 12 MG/DL — SIGNIFICANT CHANGE UP (ref 7–23)
CALCIUM SERPL-MCNC: 8.6 MG/DL — SIGNIFICANT CHANGE UP (ref 8.4–10.5)
CHLORIDE SERPL-SCNC: 108 MMOL/L — HIGH (ref 98–107)
CO2 SERPL-SCNC: 24 MMOL/L — SIGNIFICANT CHANGE UP (ref 22–31)
CREAT SERPL-MCNC: 1.24 MG/DL — SIGNIFICANT CHANGE UP (ref 0.5–1.3)
GLUCOSE SERPL-MCNC: 102 MG/DL — HIGH (ref 70–99)
MAGNESIUM SERPL-MCNC: 1.6 MG/DL — SIGNIFICANT CHANGE UP (ref 1.6–2.6)
PHOSPHATE SERPL-MCNC: 2.3 MG/DL — LOW (ref 2.5–4.5)
POTASSIUM SERPL-MCNC: 3.6 MMOL/L — SIGNIFICANT CHANGE UP (ref 3.5–5.3)
POTASSIUM SERPL-SCNC: 3.6 MMOL/L — SIGNIFICANT CHANGE UP (ref 3.5–5.3)
SODIUM SERPL-SCNC: 133 MMOL/L — LOW (ref 135–145)

## 2021-08-19 PROCEDURE — 99232 SBSQ HOSP IP/OBS MODERATE 35: CPT

## 2021-08-19 RX ORDER — HYDROMORPHONE HYDROCHLORIDE 2 MG/ML
0.5 INJECTION INTRAMUSCULAR; INTRAVENOUS; SUBCUTANEOUS EVERY 4 HOURS
Refills: 0 | Status: DISCONTINUED | OUTPATIENT
Start: 2021-08-19 | End: 2021-08-20

## 2021-08-19 RX ADMIN — HEPARIN SODIUM 5000 UNIT(S): 5000 INJECTION INTRAVENOUS; SUBCUTANEOUS at 13:51

## 2021-08-19 RX ADMIN — HYDROMORPHONE HYDROCHLORIDE 0.5 MILLIGRAM(S): 2 INJECTION INTRAMUSCULAR; INTRAVENOUS; SUBCUTANEOUS at 06:24

## 2021-08-19 RX ADMIN — HYDROMORPHONE HYDROCHLORIDE 0.5 MILLIGRAM(S): 2 INJECTION INTRAMUSCULAR; INTRAVENOUS; SUBCUTANEOUS at 21:22

## 2021-08-19 RX ADMIN — HYDROMORPHONE HYDROCHLORIDE 0.5 MILLIGRAM(S): 2 INJECTION INTRAMUSCULAR; INTRAVENOUS; SUBCUTANEOUS at 01:00

## 2021-08-19 RX ADMIN — DEXTROSE MONOHYDRATE, SODIUM CHLORIDE, AND POTASSIUM CHLORIDE 80 MILLILITER(S): 50; .745; 4.5 INJECTION, SOLUTION INTRAVENOUS at 06:25

## 2021-08-19 RX ADMIN — HYDROMORPHONE HYDROCHLORIDE 0.5 MILLIGRAM(S): 2 INJECTION INTRAMUSCULAR; INTRAVENOUS; SUBCUTANEOUS at 18:45

## 2021-08-19 RX ADMIN — HYDROMORPHONE HYDROCHLORIDE 0.5 MILLIGRAM(S): 2 INJECTION INTRAMUSCULAR; INTRAVENOUS; SUBCUTANEOUS at 18:33

## 2021-08-19 RX ADMIN — HYDROMORPHONE HYDROCHLORIDE 0.5 MILLIGRAM(S): 2 INJECTION INTRAMUSCULAR; INTRAVENOUS; SUBCUTANEOUS at 00:31

## 2021-08-19 RX ADMIN — HEPARIN SODIUM 5000 UNIT(S): 5000 INJECTION INTRAVENOUS; SUBCUTANEOUS at 06:25

## 2021-08-19 RX ADMIN — HYDROMORPHONE HYDROCHLORIDE 0.5 MILLIGRAM(S): 2 INJECTION INTRAMUSCULAR; INTRAVENOUS; SUBCUTANEOUS at 21:37

## 2021-08-19 RX ADMIN — HEPARIN SODIUM 5000 UNIT(S): 5000 INJECTION INTRAVENOUS; SUBCUTANEOUS at 21:18

## 2021-08-19 NOTE — CHART NOTE - NSCHARTNOTEFT_GEN_A_CORE
Patient is a 71 year old micheline w/ past medical history of throat cancer.  Patient recently completed 7 weeks of chemo/radiation and presented to [facility] with a 3 day history of increasing difficulty with swallowing both liquids and solids and this is associated with an 8/10 throat pain. He has an increase in secretions and "phlegm" with difficulty in swallowing his secretions at times.  Patient unable to tolerate any oral nutrition likely due to severe radiation pharyngitis.  Patient was assessed by dietician and diagnosed with sever calorie protein malnutrition requiring enteral feeds via PEG.    Dietitian Nutritition Risk Notification:   Nutrition Diagnosis & Parameters:  Findings Based on Comprehensive Nutrition Assessment, Consultation Performed on	16-Aug-2021  Upon Nutritional Assessment by the Registered Dietitian Your Patient was Determined to Meet Criteria/Has Evidence of the Following Diagnosis:	Severe protein-calorie malnutrition  Other Risk Factors	Underweight (BMI < 19)  Etiology-Basis	Chronic illness  Malnutrition Evaluation as Demonstrated by (Adults > 20 years of age)	Weight loss...  Loss of subcutaneous fat...  Loss of muscle...  Weight Loss	> 7.5% in 3 months  Body Fat (loss of subcutaneous fat)	Orbital...  Triceps...  Buccal...  Orbital Depletion is	severe  Triceps Depletion is	moderate  Buccal Depletion is	moderate  Muscle Mass (Loss of Muscle)	Temples...  Clavicles...  Shoulders...  Scapula...  Thigh...  Calf...  Temples Depletion is	severe  Clavicles Depletion is	severe  Shoulders Depletion is	severe  Scapula Depletion is	severe  Thigh Depletion is	severe  Calf Depletion is	severe Patient is a 71 year old micheline w/ past medical history of throat cancer.  Patient recently completed 7 weeks of chemo/radiation and presented to [facility] with a 3 day history of increasing difficulty with swallowing both liquids and solids and this is associated with an 8/10 throat pain. He has an increase in secretions and "phlegm" with difficulty in swallowing his secretions at times.  Patient unable to tolerate any oral nutrition likely due to severe radiation pharyngitis.  Patient was assessed by dietician and diagnosed with sever calorie protein malnutrition requiring enteral feeds via PEG.  Expected Duration 99 years.    Dietitian Nutritition Risk Notification:   Nutrition Diagnosis & Parameters:  Findings Based on Comprehensive Nutrition Assessment, Consultation Performed on	16-Aug-2021  Upon Nutritional Assessment by the Registered Dietitian Your Patient was Determined to Meet Criteria/Has Evidence of the Following Diagnosis:	Severe protein-calorie malnutrition  Other Risk Factors	Underweight (BMI < 19)  Etiology-Basis	Chronic illness  Malnutrition Evaluation as Demonstrated by (Adults > 20 years of age)	Weight loss...  Loss of subcutaneous fat...  Loss of muscle...  Weight Loss	> 7.5% in 3 months  Body Fat (loss of subcutaneous fat)	Orbital...  Triceps...  Buccal...  Orbital Depletion is	severe  Triceps Depletion is	moderate  Buccal Depletion is	moderate  Muscle Mass (Loss of Muscle)	Temples...  Clavicles...  Shoulders...  Scapula...  Thigh...  Calf...  Temples Depletion is	severe  Clavicles Depletion is	severe  Shoulders Depletion is	severe  Scapula Depletion is	severe  Thigh Depletion is	severe  Calf Depletion is	severe Patient is a 71 year old micheline w/ past medical history of throat cancer.  Patient recently completed 7 weeks of chemo/radiation and presented to [facility] with a 3 day history of increasing difficulty with swallowing both liquids and solids and this is associated with an 8/10 throat pain. He has an increase in secretions and "phlegm" with difficulty in swallowing his secretions at times.  Patient unable to tolerate any oral nutrition likely due to severe radiation pharyngitis.  Patient was assessed by dietician and diagnosed with sever calorie protein malnutrition requiring enteral feeds via PEG. Speech and swallow not performed due to patients clear inability to tolerate oral intake, patient visibly unable to manage own secretions.  Expected Duration Lifetime.      Dietitian Nutritition Risk Notification:   Nutrition Diagnosis & Parameters:  Findings Based on Comprehensive Nutrition Assessment, Consultation Performed on	16-Aug-2021  Upon Nutritional Assessment by the Registered Dietitian Your Patient was Determined to Meet Criteria/Has Evidence of the Following Diagnosis:	Severe protein-calorie malnutrition  Other Risk Factors	Underweight (BMI < 19)  Etiology-Basis	Chronic illness  Malnutrition Evaluation as Demonstrated by (Adults > 20 years of age)	Weight loss...  Loss of subcutaneous fat...  Loss of muscle...  Weight Loss	> 7.5% in 3 months  Body Fat (loss of subcutaneous fat)	Orbital...  Triceps...  Buccal...  Orbital Depletion is	severe  Triceps Depletion is	moderate  Buccal Depletion is	moderate  Muscle Mass (Loss of Muscle)	Temples...  Clavicles...  Shoulders...  Scapula...  Thigh...  Calf...  Temples Depletion is	severe  Clavicles Depletion is	severe  Shoulders Depletion is	severe  Scapula Depletion is	severe  Thigh Depletion is	severe  Calf Depletion is	severe

## 2021-08-19 NOTE — PROGRESS NOTE ADULT - ASSESSMENT
71 yr old male with throat cancer presenting with odynophagia 2/2 radiation induced pharyngitis/esophagitis admitted for PEG tube placement in the setting of severe protein calorie malnutrition. Pt s/p PEG tube placement by thoracic surgery on 8/18

## 2021-08-19 NOTE — PROGRESS NOTE ADULT - PROBLEM SELECTOR PROBLEM 7
DVT prophylaxis
Anemia in neoplastic disease
Hypernatremia

## 2021-08-19 NOTE — PROGRESS NOTE ADULT - PROBLEM SELECTOR PLAN 7
Pt chronically anemic and leukopenic due to underlying malignancy.  Today Hb 6.6, down from 8.5 yesterday, asymptomatic. ? lab error, No signs of bleeding.  -Repeat CBC  -If Hb <7, will transfuse PRBC
Hb/Hct stable.  -If Hb <7, will transfuse PRBC
SQ Heparin
Hb/Hct stable.  -If Hb <7, will transfuse PRBC

## 2021-08-19 NOTE — SWALLOW BEDSIDE ASSESSMENT ADULT - SWALLOW EVAL: DIAGNOSIS
Patient presents with Severe OroPharyngeal Dysphagia characterized by adequate oral containment, with oral holding of the bolus with prolonged delayed transfer of the bolus exacerbated by severe odynophagia state (facial grimacing noted); suspect delayed initiation of the pharyngeal swallow with reduced laryngeal elevation upon palpation also resulting in the inability to clear the severity of the oral stage residue requiring suctioning to clear from the oral cavity. Patient exhibits a diminished swallow capacity exacerbated by severity of odynophagia/dysphagia state at this time which will impact on patient’s nutritional intake resulting in malnutrition/dehydration.

## 2021-08-19 NOTE — SWALLOW BEDSIDE ASSESSMENT ADULT - SWALLOW EVAL: RECOMMENDED DIET
1) Oral nutrition is contraindicated 2) Continue with non-oral feedings via PEG tube to maintain adequate caloric hydration/medication needs due to severity of oropharyngeal/odynophagia state.

## 2021-08-19 NOTE — PROGRESS NOTE ADULT - SUBJECTIVE AND OBJECTIVE BOX
Subjective: Denies any chest pains or SOB    Vital Signs:  Vital Signs Last 24 Hrs  T(C): 36.8 (08-19-21 @ 06:20), Max: 37.2 (08-18-21 @ 12:56)  T(F): 98.3 (08-19-21 @ 06:20), Max: 98.9 (08-18-21 @ 12:56)  HR: 81 (08-19-21 @ 06:20) (68 - 94)  BP: 132/81 (08-19-21 @ 06:20) (115/74 - 162/78)  RR: 18 (08-19-21 @ 06:20) (17 - 18)  SpO2: 99% (08-19-21 @ 06:20) (99% - 99%) on (O2)      General: Awake, A&Ox3, NAD, sitting-up in bed comfortably on room-air  Eyes: Scleras clear, PERRLA/ EOMI, Gross vision intact  ENT: Gross hearing intact, grossly patent pharynx, no stridor  Neck: Neck supple, trachea midline, No JVD  Respiratory: CTA B/L  CV: S1S2; no audible murmurs  Abdominal: +BS's x4, soft, ND/NT  Extremities: No edema, + peripheral pulses  Skin: No Rashes, Hematoma, Ecchymosis  Tubes: PEG site C/D/I                          8.1    6.06  )-----------( 215      ( 18 Aug 2021 06:21 )             24.6       08-19    133<L>  |  108<H>  |  12  ----------------------------<  102<H>  3.6   |  24  |  1.24    Ca    8.6      Phos  2.3     Mg     1.60        8/18  PT: 14.4 sec;   INR: 1.28 ratio    PTT:34.0 sec      MEDICATIONS  (STANDING):  dextrose 5% + sodium chloride 0.45% with potassium chloride 20 mEq/L 1000 milliLiter(s) (80 mL/Hr) IV Continuous <Continuous>  heparin   Injectable 5000 Unit(s) SubCutaneous every 8 hours    MEDICATIONS  (PRN):  aluminum hydroxide/magnesium hydroxide/simethicone Suspension 30 milliLiter(s) Oral every 4 hours PRN Dyspepsia  benzocaine 15 mG/menthol 3.6 mG (Sugar-Free) Lozenge 1 Lozenge Oral every 2 hours PRN Sore Throat  HYDROmorphone  Injectable 0.5 milliGRAM(s) IV Push every 4 hours PRN Severe Pain (7 - 10)  lidocaine 2% Viscous 15 milliLiter(s) Swish and Spit every 3 hours PRN Mouth Care  melatonin 3 milliGRAM(s) Oral at bedtime PRN Insomnia  ondansetron Injectable 4 milliGRAM(s) IV Push every 8 hours PRN Nausea and/or Vomiting      Assessment  72 y/o male w/ PAST MEDICAL & SURGICAL HISTORY:  Oropharynx cancer    S/P PEG placement 8/18/21    PLAN  NPO w/ PEG feeds  PEG feeds currently running at 20ml/hr  No complaints    Care as per Primary Team    Will continue to follow    Patient discussed with Thoracic Surgery team on multidisciplinary rounds this morning

## 2021-08-19 NOTE — SWALLOW BEDSIDE ASSESSMENT ADULT - COMMENTS
Per H&P report, patient is a "71 yr old male with a pmh of throat cancer- recently just completed 7 weeks of chemo/radiation who presents with a 3 day history of increasing difficulty with swallowing both liquids and solids and this is associated with an 8/10 throat pain. He has an increase in secretions and "phlegm" with difficulty in swallowing his secretions at times."    Per [employer] chart, patient is seen by Dr. Gramajo/Dr. Mera. Patient last seen by Dr. Gramajo on 8/13/21, at which time patient was sent to ER for pain and possible PEG placement.     Consult received and chart reviewed. Patient seen at bedside this afternoon for an initial assessment of the swallow function, at which time patient was alert and cooperative. Patient is known to this department as he was seen for a clinical swallow assessment as an outpatient on 6/11/21 (per [employer] report), at which time regular solids with thin liquids was recommended. Patient reports completing chemo/RT. Patient reports suctioning his own secretions "everyone 10 minutes or so". Patient further reports severe pain with swallow, rating pain an 8 (on a scale of 10). Per H&P report, patient is a "71 yr old male with a pmh of throat cancer- recently just completed 7 weeks of chemo/radiation who presents with a 3 day history of increasing difficulty with swallowing both liquids and solids and this is associated with an 8/10 throat pain. He has an increase in secretions and "phlegm" with difficulty in swallowing his secretions at times."    Per [employer] chart, patient is seen by Dr. Gramajo/Dr. Mera. Patient last seen by Dr. Gramajo on 8/13/21, at which time patient was sent to ER for pain and possible PEG placement.     Consult received and chart reviewed. Patient seen at bedside this afternoon for an initial assessment of the swallow function, at which time patient was alert and cooperative. Patient is known to this department as he was seen for a clinical swallow assessment as an outpatient at [facility] on 6/11/21 (per [employer] report), at which time regular solids with thin liquids was recommended. Patient reports completing chemo/RT. Patient reports suctioning his own secretions "everyone 10 minutes or so". Patient further reports severe pain with swallow, rating pain an 8 (on a scale of 10).

## 2021-08-20 ENCOUNTER — TRANSCRIPTION ENCOUNTER (OUTPATIENT)
Age: 71
End: 2021-08-20

## 2021-08-20 ENCOUNTER — APPOINTMENT (OUTPATIENT)
Dept: INFUSION THERAPY | Facility: HOSPITAL | Age: 71
End: 2021-08-20

## 2021-08-20 DIAGNOSIS — D72.829 ELEVATED WHITE BLOOD CELL COUNT, UNSPECIFIED: ICD-10-CM

## 2021-08-20 LAB
ANION GAP SERPL CALC-SCNC: 12 MMOL/L — SIGNIFICANT CHANGE UP (ref 7–14)
BASOPHILS # BLD AUTO: 0.05 K/UL — SIGNIFICANT CHANGE UP (ref 0–0.2)
BASOPHILS NFR BLD AUTO: 0.3 % — SIGNIFICANT CHANGE UP (ref 0–2)
BLD GP AB SCN SERPL QL: NEGATIVE — SIGNIFICANT CHANGE UP
BUN SERPL-MCNC: 11 MG/DL — SIGNIFICANT CHANGE UP (ref 7–23)
CALCIUM SERPL-MCNC: 8.6 MG/DL — SIGNIFICANT CHANGE UP (ref 8.4–10.5)
CHLORIDE SERPL-SCNC: 102 MMOL/L — SIGNIFICANT CHANGE UP (ref 98–107)
CO2 SERPL-SCNC: 23 MMOL/L — SIGNIFICANT CHANGE UP (ref 22–31)
CREAT SERPL-MCNC: 1.14 MG/DL — SIGNIFICANT CHANGE UP (ref 0.5–1.3)
EOSINOPHIL # BLD AUTO: 0 K/UL — SIGNIFICANT CHANGE UP (ref 0–0.5)
EOSINOPHIL NFR BLD AUTO: 0 % — SIGNIFICANT CHANGE UP (ref 0–6)
GLUCOSE SERPL-MCNC: 126 MG/DL — HIGH (ref 70–99)
HCT VFR BLD CALC: 22.9 % — LOW (ref 39–50)
HGB BLD-MCNC: 7.6 G/DL — LOW (ref 13–17)
IANC: 12.59 K/UL — HIGH (ref 1.5–8.5)
IMM GRANULOCYTES NFR BLD AUTO: 5.4 % — HIGH (ref 0–1.5)
LYMPHOCYTES # BLD AUTO: 0.38 K/UL — LOW (ref 1–3.3)
LYMPHOCYTES # BLD AUTO: 2.5 % — LOW (ref 13–44)
MAGNESIUM SERPL-MCNC: 1.4 MG/DL — LOW (ref 1.6–2.6)
MCHC RBC-ENTMCNC: 31.5 PG — SIGNIFICANT CHANGE UP (ref 27–34)
MCHC RBC-ENTMCNC: 33.2 GM/DL — SIGNIFICANT CHANGE UP (ref 32–36)
MCV RBC AUTO: 95 FL — SIGNIFICANT CHANGE UP (ref 80–100)
MONOCYTES # BLD AUTO: 1.21 K/UL — HIGH (ref 0–0.9)
MONOCYTES NFR BLD AUTO: 8 % — SIGNIFICANT CHANGE UP (ref 2–14)
NEUTROPHILS # BLD AUTO: 12.59 K/UL — HIGH (ref 1.8–7.4)
NEUTROPHILS NFR BLD AUTO: 83.8 % — HIGH (ref 43–77)
NRBC # BLD: 0 /100 WBCS — SIGNIFICANT CHANGE UP
NRBC # FLD: 0 K/UL — SIGNIFICANT CHANGE UP
PHOSPHATE SERPL-MCNC: 2.2 MG/DL — LOW (ref 2.5–4.5)
PLATELET # BLD AUTO: 224 K/UL — SIGNIFICANT CHANGE UP (ref 150–400)
POTASSIUM SERPL-MCNC: 4 MMOL/L — SIGNIFICANT CHANGE UP (ref 3.5–5.3)
POTASSIUM SERPL-SCNC: 4 MMOL/L — SIGNIFICANT CHANGE UP (ref 3.5–5.3)
RBC # BLD: 2.41 M/UL — LOW (ref 4.2–5.8)
RBC # FLD: 14.6 % — HIGH (ref 10.3–14.5)
RH IG SCN BLD-IMP: POSITIVE — SIGNIFICANT CHANGE UP
SODIUM SERPL-SCNC: 137 MMOL/L — SIGNIFICANT CHANGE UP (ref 135–145)
WBC # BLD: 15.03 K/UL — HIGH (ref 3.8–10.5)
WBC # FLD AUTO: 15.03 K/UL — HIGH (ref 3.8–10.5)

## 2021-08-20 PROCEDURE — 99232 SBSQ HOSP IP/OBS MODERATE 35: CPT

## 2021-08-20 RX ORDER — ACETAMINOPHEN 500 MG
975 TABLET ORAL ONCE
Refills: 0 | Status: COMPLETED | OUTPATIENT
Start: 2021-08-20 | End: 2021-08-20

## 2021-08-20 RX ORDER — MAGNESIUM SULFATE 500 MG/ML
1 VIAL (ML) INJECTION ONCE
Refills: 0 | Status: COMPLETED | OUTPATIENT
Start: 2021-08-20 | End: 2021-08-20

## 2021-08-20 RX ORDER — SODIUM,POTASSIUM PHOSPHATES 278-250MG
1 POWDER IN PACKET (EA) ORAL EVERY 6 HOURS
Refills: 0 | Status: DISCONTINUED | OUTPATIENT
Start: 2021-08-20 | End: 2021-08-21

## 2021-08-20 RX ADMIN — HYDROMORPHONE HYDROCHLORIDE 0.5 MILLIGRAM(S): 2 INJECTION INTRAMUSCULAR; INTRAVENOUS; SUBCUTANEOUS at 17:45

## 2021-08-20 RX ADMIN — HEPARIN SODIUM 5000 UNIT(S): 5000 INJECTION INTRAVENOUS; SUBCUTANEOUS at 13:23

## 2021-08-20 RX ADMIN — HYDROMORPHONE HYDROCHLORIDE 0.5 MILLIGRAM(S): 2 INJECTION INTRAMUSCULAR; INTRAVENOUS; SUBCUTANEOUS at 04:30

## 2021-08-20 RX ADMIN — Medication 1 TABLET(S): at 17:47

## 2021-08-20 RX ADMIN — HYDROMORPHONE HYDROCHLORIDE 0.5 MILLIGRAM(S): 2 INJECTION INTRAMUSCULAR; INTRAVENOUS; SUBCUTANEOUS at 09:17

## 2021-08-20 RX ADMIN — HEPARIN SODIUM 5000 UNIT(S): 5000 INJECTION INTRAVENOUS; SUBCUTANEOUS at 04:31

## 2021-08-20 RX ADMIN — Medication 975 MILLIGRAM(S): at 23:03

## 2021-08-20 RX ADMIN — Medication 1 TABLET(S): at 13:23

## 2021-08-20 RX ADMIN — Medication 975 MILLIGRAM(S): at 22:03

## 2021-08-20 RX ADMIN — HYDROMORPHONE HYDROCHLORIDE 0.5 MILLIGRAM(S): 2 INJECTION INTRAMUSCULAR; INTRAVENOUS; SUBCUTANEOUS at 09:02

## 2021-08-20 RX ADMIN — Medication 100 GRAM(S): at 10:02

## 2021-08-20 RX ADMIN — HEPARIN SODIUM 5000 UNIT(S): 5000 INJECTION INTRAVENOUS; SUBCUTANEOUS at 22:03

## 2021-08-20 RX ADMIN — HYDROMORPHONE HYDROCHLORIDE 0.5 MILLIGRAM(S): 2 INJECTION INTRAMUSCULAR; INTRAVENOUS; SUBCUTANEOUS at 13:21

## 2021-08-20 RX ADMIN — HYDROMORPHONE HYDROCHLORIDE 0.5 MILLIGRAM(S): 2 INJECTION INTRAMUSCULAR; INTRAVENOUS; SUBCUTANEOUS at 13:35

## 2021-08-20 RX ADMIN — HYDROMORPHONE HYDROCHLORIDE 0.5 MILLIGRAM(S): 2 INJECTION INTRAMUSCULAR; INTRAVENOUS; SUBCUTANEOUS at 17:40

## 2021-08-20 RX ADMIN — DEXTROSE MONOHYDRATE, SODIUM CHLORIDE, AND POTASSIUM CHLORIDE 80 MILLILITER(S): 50; .745; 4.5 INJECTION, SOLUTION INTRAVENOUS at 09:01

## 2021-08-20 RX ADMIN — DEXTROSE MONOHYDRATE, SODIUM CHLORIDE, AND POTASSIUM CHLORIDE 80 MILLILITER(S): 50; .745; 4.5 INJECTION, SOLUTION INTRAVENOUS at 06:53

## 2021-08-20 NOTE — DISCHARGE NOTE PROVIDER - CARE PROVIDER_API CALL
Delfino Munguia  [facility]  [address] [id number]  Phone: [phone]  Fax: [phone]  Established Patient  Follow Up Time: 1 week

## 2021-08-20 NOTE — CHART NOTE - NSCHARTNOTEFT_GEN_A_CORE
POD s/p EGD, PEG  PEG loosened today  pt tolerated well  will sign off  please reconsult with concerns    Lucero Sawyer [phone]

## 2021-08-20 NOTE — DISCHARGE NOTE NURSING/CASE MANAGEMENT/SOCIAL WORK - PATIENT PORTAL LINK FT
You can access the FollowMyHealth Patient Portal offered by [facility] by registering at the following website: [email or web address]. By joining [facility]’s FollowMyHealth portal, you will also be able to view your health information using other applications (apps) compatible with our system.

## 2021-08-20 NOTE — DISCHARGE NOTE PROVIDER - HOSPITAL COURSE
1 yr old male with throat cancer presenting with odynophagia 2/2 radiation induced pharyngitis/esophagitis admitted for PEG tube placement in the setting of severe protein calorie malnutrition. ENT was consulted in ED and scope performed with clear airway noted.   Odynophagia likely radiation induced, patient s/p PEG by T surgery on 8/18, tube feedings initiated and patient tolerating.  Patient to follow up with outpatient oncologist Dr. Gramajo after discharge.  Labs notable for leukocytosis, likely reactive after PEG placement and recently receiving Neupogen, no indicators of infection, patient was m onitored off of antibiotics.      On ___________ patient medically cleared for discharge home by _______ 71 yr old male with throat cancer presenting with odynophagia 2/2 radiation induced pharyngitis/esophagitis admitted for PEG tube placement in the setting of severe protein calorie malnutrition. ENT was consulted in ED and scope performed with clear airway noted.   Odynophagia likely radiation induced, patient s/p PEG by T surgery on 8/18, tube feedings initiated and patient tolerating.  Patient to follow up with outpatient oncologist Dr. Gramajo after discharge.  Labs notable for leukocytosis, likely reactive after PEG placement and recently receiving Neupogen, no indicators of infection, patient was m onitored off of antibiotics.      On 8/21 patient medically cleared for discharge home w/ HC

## 2021-08-20 NOTE — CHART NOTE - NSCHARTNOTESELECT_GEN_ALL_CORE
Thoracic Surgery/Event Note
Nutrition Follow Up/Nutrition Services
Thoracic Sx - PEG loosened/Event Note
Tube feeding/Event Note

## 2021-08-20 NOTE — PROGRESS NOTE ADULT - SUBJECTIVE AND OBJECTIVE BOX
Patient is a 71y old  Male who presents with a chief complaint of odynophagia (20 Aug 2021 11:50)    SUBJECTIVE / OVERNIGHT EVENTS:  Patient tolerating tube feeds at goal rate. However, patient in significant amount of pain at PEG tube site after thoracic surgery team made positional adjustment earlier in the day. Pt otherwise without fever, chest pain, SOb, n/v. Pt with slight improvement in odynophagia but still unable to tolerate anything PO.     MEDICATIONS  (STANDING):  dextrose 5% + sodium chloride 0.45% with potassium chloride 20 mEq/L 1000 milliLiter(s) (80 mL/Hr) IV Continuous <Continuous>  heparin   Injectable 5000 Unit(s) SubCutaneous every 8 hours  potassium phosphate / sodium phosphate Tablet (K-PHOS No. 2) 1 Tablet(s) Oral every 6 hours    MEDICATIONS  (PRN):  aluminum hydroxide/magnesium hydroxide/simethicone Suspension 30 milliLiter(s) Oral every 4 hours PRN Dyspepsia  benzocaine 15 mG/menthol 3.6 mG (Sugar-Free) Lozenge 1 Lozenge Oral every 2 hours PRN Sore Throat  HYDROmorphone  Injectable 0.5 milliGRAM(s) IV Push every 4 hours PRN Severe Pain (7 - 10)  lidocaine 2% Viscous 15 milliLiter(s) Swish and Spit every 3 hours PRN Mouth Care  melatonin 3 milliGRAM(s) Oral at bedtime PRN Insomnia  ondansetron Injectable 4 milliGRAM(s) IV Push every 8 hours PRN Nausea and/or Vomiting      Vital Signs Last 24 Hrs  T(C): 37 (20 Aug 2021 09:00), Max: 37.2 (20 Aug 2021 04:24)  T(F): 98.6 (20 Aug 2021 09:00), Max: 99 (20 Aug 2021 04:24)  HR: 96 (20 Aug 2021 09:00) (92 - 102)  BP: 128/75 (20 Aug 2021 09:00) (105/70 - 136/74)  RR: 18 (20 Aug 2021 09:00) (16 - 18)  SpO2: 98% (20 Aug 2021 09:00) (95% - 98%)          PHYSICAL EXAM  GENERAL: NAD, on RA, thin framed  MOUTH: superficial ulcerations noted on hard palate  NECK: skin hyperpigmentation, desquamation and scabs noted externally, less desquamation compared to prior exams  CHEST/LUNG: Clear to auscultation bilaterally; No wheeze  HEART: Regular rate and rhythm;   ABDOMEN: Soft, tender around gastrostomy site, Nondistended; +PEG tube in place, dressing c/d/i - tube feeds infusing  EXTREMITIES:  ROM intact, No clubbing, cyanosis, or edema  PSYCH: AAOx3      LABS:                        7.6    15.03 )-----------( 224      ( 20 Aug 2021 06:32 )             22.9     08-20    137  |  102  |  11  ----------------------------<  126<H>  4.0   |  23  |  1.14    Ca    8.6      20 Aug 2021 06:32  Phos  2.2     08-20  Mg     1.40     08-20          Consultant(s) Notes Reviewed:  yes  Care Discussed with Consultants/Other Providers:

## 2021-08-20 NOTE — DISCHARGE NOTE PROVIDER - DETAILS OF MALNUTRITION DIAGNOSIS/DIAGNOSES
This patient has been assessed with a concern for Malnutrition and was treated during this hospitalization for the following Nutrition diagnosis/diagnoses:     -  08/16/2021: Severe protein-calorie malnutrition   -  08/16/2021: Underweight (BMI < 19)

## 2021-08-20 NOTE — DISCHARGE NOTE NURSING/CASE MANAGEMENT/SOCIAL WORK - NSDCPEFALRISK_GEN_ALL_CORE
For information on Fall & injury Prevention, visit [email or web address]

## 2021-08-20 NOTE — DISCHARGE NOTE PROVIDER - NSDCMRMEDTOKEN_GEN_ALL_CORE_FT
lidocaine 2% mucous membrane solution: 15 milliliter(s) mucous membrane every 4 hours, As Needed pain  melatonin 3 mg oral tablet: 1 tab(s) orally once a day (at bedtime), As needed, Insomnia

## 2021-08-20 NOTE — DISCHARGE NOTE PROVIDER - NSDCFUSCHEDAPPT_GEN_ALL_CORE_FT
CATARINO THAYER ; 08/20/2021 ; [facility]  CATARINO THAYER ; 08/24/2021 ; [facility]  CATARINO THAYER ; 08/27/2021 ; [facility]  CATARINO THAYER ; 10/06/2021 ; [facility] [address] CATARINO THAYER ; 08/24/2021 ; [facility]  CATARINO THAYER ; 08/27/2021 ; [facility]  CATARINO THAYER ; 10/06/2021 ; [facility] [address]

## 2021-08-20 NOTE — DISCHARGE NOTE PROVIDER - NSDCCPCAREPLAN_GEN_ALL_CORE_FT
PRINCIPAL DISCHARGE DIAGNOSIS  Diagnosis: Throat pain  Assessment and Plan of Treatment: You were admitted with throat pain and inability to tolerate oral intake. You had a PEG tube placed for nutrition.  Follow up with your oncologist for further treatment and monitoring.      SECONDARY DISCHARGE DIAGNOSES  Diagnosis: Odynophagia  Assessment and Plan of Treatment: You had a PEG tube placed for nutrition.  Follow up with your oncologist for further treatment and monitoring.    Diagnosis: Throat cancer  Assessment and Plan of Treatment: Follow up wiht your Oncologist     PRINCIPAL DISCHARGE DIAGNOSIS  Diagnosis: Throat pain  Assessment and Plan of Treatment: You were admitted with throat pain and inability to tolerate oral intake. You had a PEG tube placed for nutrition.  Follow up with your oncologist for further treatment and monitoring.      SECONDARY DISCHARGE DIAGNOSES  Diagnosis: Odynophagia  Assessment and Plan of Treatment: You had a PEG tube placed for nutrition.  Follow up with your oncologist for further treatment and monitoring.  Jevity 1.2, 360 ml bolus, every 6 hrs via bolus    Diagnosis: Throat cancer  Assessment and Plan of Treatment: Follow up wiht your Oncologist

## 2021-08-21 VITALS
DIASTOLIC BLOOD PRESSURE: 82 MMHG | HEART RATE: 86 BPM | RESPIRATION RATE: 19 BRPM | OXYGEN SATURATION: 98 % | TEMPERATURE: 98 F | SYSTOLIC BLOOD PRESSURE: 136 MMHG

## 2021-08-21 LAB
ANION GAP SERPL CALC-SCNC: 11 MMOL/L — SIGNIFICANT CHANGE UP (ref 7–14)
BUN SERPL-MCNC: 10 MG/DL — SIGNIFICANT CHANGE UP (ref 7–23)
CALCIUM SERPL-MCNC: 8.3 MG/DL — LOW (ref 8.4–10.5)
CHLORIDE SERPL-SCNC: 102 MMOL/L — SIGNIFICANT CHANGE UP (ref 98–107)
CO2 SERPL-SCNC: 24 MMOL/L — SIGNIFICANT CHANGE UP (ref 22–31)
CREAT SERPL-MCNC: 1.12 MG/DL — SIGNIFICANT CHANGE UP (ref 0.5–1.3)
GLUCOSE SERPL-MCNC: 104 MG/DL — HIGH (ref 70–99)
HCT VFR BLD CALC: 22.5 % — LOW (ref 39–50)
HGB BLD-MCNC: 7.2 G/DL — LOW (ref 13–17)
MAGNESIUM SERPL-MCNC: 1.3 MG/DL — LOW (ref 1.6–2.6)
MCHC RBC-ENTMCNC: 30.6 PG — SIGNIFICANT CHANGE UP (ref 27–34)
MCHC RBC-ENTMCNC: 32 GM/DL — SIGNIFICANT CHANGE UP (ref 32–36)
MCV RBC AUTO: 95.7 FL — SIGNIFICANT CHANGE UP (ref 80–100)
NRBC # BLD: 0 /100 WBCS — SIGNIFICANT CHANGE UP
NRBC # FLD: 0 K/UL — SIGNIFICANT CHANGE UP
PHOSPHATE SERPL-MCNC: 2.4 MG/DL — LOW (ref 2.5–4.5)
PLATELET # BLD AUTO: 226 K/UL — SIGNIFICANT CHANGE UP (ref 150–400)
POTASSIUM SERPL-MCNC: 4.4 MMOL/L — SIGNIFICANT CHANGE UP (ref 3.5–5.3)
POTASSIUM SERPL-SCNC: 4.4 MMOL/L — SIGNIFICANT CHANGE UP (ref 3.5–5.3)
RBC # BLD: 2.35 M/UL — LOW (ref 4.2–5.8)
RBC # FLD: 14.8 % — HIGH (ref 10.3–14.5)
SODIUM SERPL-SCNC: 137 MMOL/L — SIGNIFICANT CHANGE UP (ref 135–145)
WBC # BLD: 13.35 K/UL — HIGH (ref 3.8–10.5)
WBC # FLD AUTO: 13.35 K/UL — HIGH (ref 3.8–10.5)

## 2021-08-21 PROCEDURE — 99239 HOSP IP/OBS DSCHRG MGMT >30: CPT

## 2021-08-21 RX ORDER — MAGNESIUM OXIDE 400 MG ORAL TABLET 241.3 MG
400 TABLET ORAL
Refills: 0 | Status: DISCONTINUED | OUTPATIENT
Start: 2021-08-21 | End: 2021-08-21

## 2021-08-21 RX ORDER — LIDOCAINE 4 G/100G
15 CREAM TOPICAL
Qty: 0 | Refills: 0 | DISCHARGE
Start: 2021-08-21

## 2021-08-21 RX ORDER — LANOLIN ALCOHOL/MO/W.PET/CERES
1 CREAM (GRAM) TOPICAL
Qty: 0 | Refills: 0 | DISCHARGE
Start: 2021-08-21

## 2021-08-21 RX ORDER — MAGNESIUM SULFATE 500 MG/ML
1 VIAL (ML) INJECTION ONCE
Refills: 0 | Status: COMPLETED | OUTPATIENT
Start: 2021-08-21 | End: 2021-08-21

## 2021-08-21 RX ADMIN — HEPARIN SODIUM 5000 UNIT(S): 5000 INJECTION INTRAVENOUS; SUBCUTANEOUS at 05:00

## 2021-08-21 RX ADMIN — Medication 1 TABLET(S): at 05:00

## 2021-08-21 RX ADMIN — Medication 100 GRAM(S): at 11:35

## 2021-08-21 RX ADMIN — Medication 1 TABLET(S): at 01:27

## 2021-08-21 RX ADMIN — Medication 1 TABLET(S): at 11:35

## 2021-08-21 RX ADMIN — DEXTROSE MONOHYDRATE, SODIUM CHLORIDE, AND POTASSIUM CHLORIDE 80 MILLILITER(S): 50; .745; 4.5 INJECTION, SOLUTION INTRAVENOUS at 05:00

## 2021-08-21 NOTE — PROGRESS NOTE ADULT - PROBLEM SELECTOR PLAN 5
Likely in the setting dehydration from poor PO intake. Resolved with IV hydration  -Will monitor BMP daily
moderate exacerbation  Unable to eat over the past few days and overall a thin elderly male  pending PEG  IVF  Advance diet as tolerated and consider adding supplements if odynophagia improves
Hb/Hct stable.  -If Hb <7, will transfuse PRBC
Likely in the setting dehydration from poor PO intake. Resolved with IV hydration  -Will continue IVF hydration   -Will monitor BMP daily
Hb/Hct stable.  -If Hb <7, will transfuse PRBC
Likely in the setting dehydration from poor PO intake. Resolved with IV hydration  -Will continue IVF hydration   -Will monitor BMP daily
Likely due to dehydration, free water deficits: 1.5L   -Will change IVF to D51/2NS at 100cc/h   -Monitor BMP in AM
Likely due to dehydration, free water deficits: 2.3L   -Will increase IVF to D51/2NS at 150cc/h X 24 H ( taking insensible loss into consideration)   -Monitor BMP in AM

## 2021-08-21 NOTE — PROGRESS NOTE ADULT - PROBLEM SELECTOR PROBLEM 5
Hypernatremia
Mild protein-calorie malnutrition
ZORAIDA (acute kidney injury)
ZORAIDA (acute kidney injury)
Hypernatremia
Anemia in neoplastic disease
Anemia in neoplastic disease
ZORAIDA (acute kidney injury)

## 2021-08-21 NOTE — PROGRESS NOTE ADULT - PROBLEM SELECTOR PLAN 6
SQ Heparin    DISPO: Patient is medically stable to be discharged home. Spent 36 min coordinating discharge plan and counseling pt and his wife on his condition.
SQ Heparin
SQ Heparin
Likely in the setting dehydration from poor PO intake. Resolved with IV hydration  -Will continue IVF hydration   -Will monitor BMP daily
SQ Heparin

## 2021-08-21 NOTE — PROGRESS NOTE ADULT - PROVIDER SPECIALTY LIST ADULT
Thoracic Surgery
CT Surgery
Hospitalist
Thoracic Surgery
Heme/Onc
Hospitalist
Hospitalist
Thoracic Surgery
Hospitalist

## 2021-08-21 NOTE — PROGRESS NOTE ADULT - SUBJECTIVE AND OBJECTIVE BOX
Patient is a 71y old  Male who presents with a chief complaint of odynophagia (20 Aug 2021 14:18)        SUBJECTIVE / OVERNIGHT EVENTS:      MEDICATIONS  (STANDING):  dextrose 5% + sodium chloride 0.45% with potassium chloride 20 mEq/L 1000 milliLiter(s) (80 mL/Hr) IV Continuous <Continuous>  heparin   Injectable 5000 Unit(s) SubCutaneous every 8 hours  magnesium oxide 400 milliGRAM(s) Oral two times a day with meals  potassium phosphate / sodium phosphate Tablet (K-PHOS No. 2) 1 Tablet(s) Oral every 6 hours    MEDICATIONS  (PRN):  aluminum hydroxide/magnesium hydroxide/simethicone Suspension 30 milliLiter(s) Oral every 4 hours PRN Dyspepsia  benzocaine 15 mG/menthol 3.6 mG (Sugar-Free) Lozenge 1 Lozenge Oral every 2 hours PRN Sore Throat  lidocaine 2% Viscous 15 milliLiter(s) Swish and Spit every 3 hours PRN Mouth Care  melatonin 3 milliGRAM(s) Oral at bedtime PRN Insomnia  ondansetron Injectable 4 milliGRAM(s) IV Push every 8 hours PRN Nausea and/or Vomiting      Vital Signs Last 24 Hrs  T(C): 36.6 (21 Aug 2021 13:30), Max: 36.8 (21 Aug 2021 05:00)  T(F): 97.8 (21 Aug 2021 13:30), Max: 98.3 (21 Aug 2021 05:00)  HR: 86 (21 Aug 2021 13:30) (86 - 88)  BP: 136/82 (21 Aug 2021 13:30) (123/78 - 136/82)  BP(mean): --  RR: 19 (21 Aug 2021 13:30) (18 - 19)  SpO2: 98% (21 Aug 2021 13:30) (97% - 98%)  CAPILLARY BLOOD GLUCOSE        I&O's Summary        PHYSICAL EXAM  GENERAL: NAD, well-developed  HEAD:  Atraumatic, Normocephalic  EYES: EOMI, PERRLA, conjunctiva and sclera clear  NECK: Supple, No JVD  CHEST/LUNG: Clear to auscultation bilaterally; No wheeze  HEART: Regular rate and rhythm; No murmurs, rubs, or gallops  ABDOMEN: Soft, Nontender, Nondistended; Bowel sounds present  EXTREMITIES:  2+ Peripheral Pulses, No clubbing, cyanosis, or edema  PSYCH: AAOx3  SKIN: No rashes or lesions    LABS:                        7.2    13.35 )-----------( 226      ( 21 Aug 2021 07:34 )             22.5     08-21    137  |  102  |  10  ----------------------------<  104<H>  4.4   |  24  |  1.12    Ca    8.3<L>      21 Aug 2021 07:34  Phos  2.4     08-21  Mg     1.30     08-21                RADIOLOGY & ADDITIONAL TESTS:    Imaging Personally Reviewed:  Consultant(s) Notes Reviewed:    Care Discussed with Consultants/Other Providers:   Patient is a 71y old  Male who presents with a chief complaint of odynophagia (20 Aug 2021 14:18)      SUBJECTIVE / OVERNIGHT EVENTS:  Patient doing well. Pt tolerated feeds. Pt and her wife taught by nursing staff how to do bolus feeds. Pt with soreness at PEG tube site but no n/v. No fever, chills, chest pain or SOB.     MEDICATIONS  (STANDING):  dextrose 5% + sodium chloride 0.45% with potassium chloride 20 mEq/L 1000 milliLiter(s) (80 mL/Hr) IV Continuous <Continuous>  heparin   Injectable 5000 Unit(s) SubCutaneous every 8 hours  magnesium oxide 400 milliGRAM(s) Oral two times a day with meals  potassium phosphate / sodium phosphate Tablet (K-PHOS No. 2) 1 Tablet(s) Oral every 6 hours    MEDICATIONS  (PRN):  aluminum hydroxide/magnesium hydroxide/simethicone Suspension 30 milliLiter(s) Oral every 4 hours PRN Dyspepsia  benzocaine 15 mG/menthol 3.6 mG (Sugar-Free) Lozenge 1 Lozenge Oral every 2 hours PRN Sore Throat  lidocaine 2% Viscous 15 milliLiter(s) Swish and Spit every 3 hours PRN Mouth Care  melatonin 3 milliGRAM(s) Oral at bedtime PRN Insomnia  ondansetron Injectable 4 milliGRAM(s) IV Push every 8 hours PRN Nausea and/or Vomiting      Vital Signs Last 24 Hrs  T(C): 36.6 (21 Aug 2021 13:30), Max: 36.8 (21 Aug 2021 05:00)  T(F): 97.8 (21 Aug 2021 13:30), Max: 98.3 (21 Aug 2021 05:00)  HR: 86 (21 Aug 2021 13:30) (86 - 88)  BP: 136/82 (21 Aug 2021 13:30) (123/78 - 136/82)  RR: 19 (21 Aug 2021 13:30) (18 - 19)  SpO2: 98% (21 Aug 2021 13:30) (97% - 98%)          PHYSICAL EXAM  GENERAL: NAD, on RA, thin framed  MOUTH: superficial ulcerations noted on hard palate  NECK: skin hyperpigmentation, desquamation and scabs noted externally, less desquamation compared to prior exams  CHEST/LUNG: Clear to auscultation bilaterally; No wheeze  HEART: Regular rate and rhythm;   ABDOMEN: Soft, tender around gastrostomy site, Nondistended; +PEG tube in place, dressing c/d/i - tube feeds infusing  EXTREMITIES:  ROM intact, No clubbing, cyanosis, or edema  PSYCH: AAOx3        LABS:                        7.2    13.35 )-----------( 226      ( 21 Aug 2021 07:34 )             22.5     08-21    137  |  102  |  10  ----------------------------<  104<H>  4.4   |  24  |  1.12    Ca    8.3<L>      21 Aug 2021 07:34  Phos  2.4     08-21  Mg     1.30     08-21

## 2021-08-21 NOTE — PROGRESS NOTE ADULT - NUTRITIONAL ASSESSMENT
This patient has been assessed with a concern for Malnutrition and has been determined to have a diagnosis/diagnoses of Severe protein-calorie malnutrition and Underweight (BMI < 19).    This patient is being managed with:   Diet NPO after Midnight-     NPO Start Date: 15-Aug-2021   NPO Start Time: 23:59  Entered: Aug 15 2021  5:16PM    Diet NPO-  Entered: Aug 13 2021  9:05PM    
This patient has been assessed with a concern for Malnutrition and has been determined to have a diagnosis/diagnoses of Severe protein-calorie malnutrition and Underweight (BMI < 19).    This patient is being managed with:   Diet NPO with Tube Feed-  Tube Feeding Modality: Gastrostomy  Jevity 1.2 Hitesh (JEVITY1.2RTH)  Total Volume for 24 Hours (mL): 1440  Bolus  Total Volume of Bolus (mL):  360  Total # of Feeds: 4  Tube Feed Frequency: Every 6 hours   Tube Feed Start Time: 16:00  Bolus Feed Rate (mL per Hour): 360   Bolus Feed Duration (in Hours): 1  Entered: Aug 20 2021  3:53PM    
This patient has been assessed with a concern for Malnutrition and has been determined to have a diagnosis/diagnoses of Severe protein-calorie malnutrition and Underweight (BMI < 19).    This patient is being managed with:   Diet NPO with Tube Feed-  Tube Feeding Modality: Gastrostomy  Jevity 1.2 Hitesh (JEVITY1.2RTH)  Total Volume for 24 Hours (mL): 1440  Continuous  Starting Tube Feed Rate {mL per Hour}: 20  Increase Tube Feed Rate by (mL): 10     Every 6 hours  Until Goal Tube Feed Rate (mL per Hour): 60  Tube Feed Duration (in Hours): 24  Tube Feed Start Time: 09:30  Free Water Flush  Bolus   Total Volume per Flush (mL): 200   Frequency: Every 6 Hours    Start Time: 09:30  Entered: Aug 19 2021 11:22PM    
This patient has been assessed with a concern for Malnutrition and has been determined to have a diagnosis/diagnoses of Severe protein-calorie malnutrition and Underweight (BMI < 19).    This patient is being managed with:   Diet NPO after Midnight-     NPO Start Date: 17-Aug-2021   NPO Start Time: 23:59  Entered: Aug 17 2021  8:28PM    Diet NPO-  Entered: Aug 13 2021  9:05PM    
This patient has been assessed with a concern for Malnutrition and has been determined to have a diagnosis/diagnoses of Severe protein-calorie malnutrition and Underweight (BMI < 19).      
This patient has been assessed with a concern for Malnutrition and has been determined to have a diagnosis/diagnoses of Severe protein-calorie malnutrition and Underweight (BMI < 19).    This patient is being managed with:   Diet NPO-  Entered: Aug 13 2021  9:05PM

## 2021-08-21 NOTE — PROGRESS NOTE ADULT - PROBLEM SELECTOR PROBLEM 4
Throat cancer
ZORAIDA (acute kidney injury)
ZORAIDA (acute kidney injury)
Throat cancer

## 2021-08-21 NOTE — PROGRESS NOTE ADULT - PROBLEM SELECTOR PROBLEM 2
Electrolyte abnormality
Electrolyte abnormality
Throat cancer
Electrolyte abnormality
Electrolyte abnormality
Throat cancer
Electrolyte abnormality
Electrolyte abnormality

## 2021-08-21 NOTE — PROGRESS NOTE ADULT - PROBLEM SELECTOR PROBLEM 6
ZORAIDA (acute kidney injury)
DVT prophylaxis
Hypernatremia

## 2021-08-21 NOTE — PROGRESS NOTE ADULT - PROBLEM SELECTOR PLAN 3
moderate exacerbation  Unable to eat over the past 3 days and overall a thin elderly male  GI consult for possible PEG  IVF  Advance diet as tolerated and consider adding supplements if odynophagia improves
Likely radiation induced. ENT consulted: airway clear on scope exam, benzocaine lozenges. Pt s/p PEG placement by thoracic surgery today  - c/w IVF since pt with no oral intake  - diet as tolerated with aspiration precautions   - evaluated by dietary team; plan to start enteral feeds tomorrow, Jevity 1.2 @ 60 ml/hr x 24 hrs, 1440 ml total volume
Likely radiation induced. ENT consulted: airway clear on scope exam, benzocaine lozenges. Pt s/p PEG placement by thoracic surgery today  - c/w IVF since pt with no oral intake  - diet as tolerated with aspiration precautions   - evaluated by dietary team; started enteral feeds today --> goal Jevity 1.2 @ 60 ml/hr x 24 hrs, 1440 ml total volume; pt current at rate of 20cc/hr
moderate exacerbation  Unable to eat over the past 3 days and overall a thin elderly male  GI consult for possible PEG  IVF  Advance diet as tolerated and consider adding supplements if odynophagia improves
Likely radiation induced. ENT consulted: airway clear on scope exam, benzocaine lozenges  - c/w IVF since pt with no oral intake  - diet as tolerated with aspiration precautions   - pending PEG placement by thoracic surgery, anticipated date 8/18  - evaluated by dietary team; will need to f/u with nutrition regarding tube feeding recommendations.
Likely radiation induced. ENT consulted: airway clear on scope exam, benzocaine lozenges  - c/w IVF since pt with no oral intake  - diet as tolerated with aspiration precautions   - pending PEG placement by thoracic surgery, anticipated date 8/18
Likely radiation induced. ENT consulted: airway clear on scope exam, benzocaine lozenges. Pt s/p PEG placement by thoracic surgery today  - diet as tolerated with aspiration precautions   - evaluated by dietary team; started enteral feeds today --> pt at goal Jevity 1.2 @ 60 ml/hr x 24 hrs, 1440 ml total volume; will transition to bolus feeds
Likely radiation induced. ENT consulted: airway clear on scope exam, benzocaine lozenges. Pt s/p PEG placement by thoracic surgery today  - diet as tolerated with aspiration precautions   - evaluated by dietary team; c/w bolus tube feeds

## 2021-08-21 NOTE — PROGRESS NOTE ADULT - PROBLEM SELECTOR PLAN 4
Likely in the setting dehydration from poor PO intake  Creat downtrending with IVF, 1.35 today   -Will continue IVF hydration see below  -Will monitor BMP daily
Likely in the setting dehydration from poor PO intake  Creat downtrending with IVF  -Will continue IVF hydration see below  -Will monitor BMP daily
Tonsillar squamous cell carcinoma; T2,N1; Stage I, p16+  - on concurrent chemoRT with cisplatin -> carboplatin/paclitaxel after C5 2/2 ZORAIDA  - finished carbo/paclitaxel 08/06 x2 cycles  - has two fractions RT remaining but per outpatient discussion with rad onc Dr. Arias will conclude RT with what he has received thus far  - f/u with Dr Gramajo at [facility]
Tonsillar squamous cell carcinoma; T2,N1; Stage I, p16+  - on concurrent chemoRT with cisplatin -> carboplatin/paclitaxel after C5 2/2 ZORAIDA  - finished carbo/paclitaxel 08/06 x2 cycles  - has two fractions RT remaining but per outpatient discussion with rad onc Dr. Arias will conclude RT with what he has received thus far  - f/u with Dr Gramajo at [facility]
Tonsillar squamous cell carcinoma; T2,N1; Stage I, p16+  - on concurrent chemoRT with cisplatin -> carboplatin/paclitaxel after C5 2/2 ZORAIDA  - finished carbo/paclitaxel 08/06 x2 cycles  - has two fractions RT remaining but per outpatient discussion with rad onc Dr. Arias will conclude RT with what he has received thus far  - f/u with Dr Gramajo at [facility]
Tonsillar squamous cell carcinoma; T2,N1; Stage I, p16+  - on concurrent chemoRT with cisplatin -> carboplatin/paclitaxel after C5 2/2 ZORAIDA  - finished carbo/paclitaxel 08/06 x2 cycles  - has two fractions RT remaining but per outpatient discussion with rad onc Dr. Arias will conclude RT with what he has received thus far  - f/u with Dr Gramajo at [facility]
Tonsillar squamous cell carcinoma; T2,N1; Stage I, p16+  - on concurrent chemoRT with cisplatin -> carboplatin/paclitaxel after C5 2/2 ZORAIDA  - finished carbo/paclitaxel 08/06 x2 cycles  - has two fractions RT remaining but per outpatient discussion with rad onc Dr. Arias will conclude RT with what he has received thus far  - f/u with Dr Gramajo at [facility]
Tonsillar squamous cell carcinoma; T2,N1; Stage I, p16+  - on concurrent chemoRT with cisplatin -> carboplatin/paclitaxel after C5 2/2 ZORAIDA  - finished carbo/paclitaxel 08/06 x2 cycles  - has two fractions RT remaining but per outpatient discussion with rad onc Dr. Arias will conclude RT with what he has received thus far  - f/u with Dr Gramajo at [facility]

## 2021-08-21 NOTE — PROGRESS NOTE ADULT - PROBLEM SELECTOR PLAN 1
Likely chemo induced. Pt without signs/symptom of acute infection. Resolved s/p receiving Neupogen on 8/17/21  - trend CBC with diff
New  3 day history of odynophagia with solids and liquids. pain 8/10  CT neck as above  ENT consulted: airway clear on scope exam, benzocaine lozenges  IVF   Advance diet as tolerated with aspiration precautions   Heme onc/Rad onc consult in AM  GI emailed for possible PEG    Cr 1.7 lwith unknown baseline- monitor with IVF
Possible reactive after recent PEG placement and pt also s/p Neupogen. Pt without any localizing symptoms aside from PEG tube site pain to suggest acute infection. Pt remains afebrile and VS stable.  - will monitor WBC  - no need for infectious w/u at this time or empiric abx
Likely chemo induced. Pt without signs/symptom of acute infection. ANC less than 1000  - give neupogen x1 08/17 as patient did not receive GCSF with last chemo as per oncology  - trend CBC with diff daily for possible additional dose tomorrow if ANC remains low
Likely chemo induced. Pt without signs/symptom of acute infection.  - will check ANC  - will discuss with oncology if any indication to give G-CSF pending on ANC level
New  3 day history of odynophagia with solids and liquids. pain 8/10  CT neck as above  ENT consulted: airway clear on scope exam, benzocaine lozenges  IVF   Advance diet as tolerated with aspiration precautions   Heme onc/Rad onc consult in AM  GI emailed for possible PEG    Cr 1.7 lwith unknown baseline- monitor with IVF, improving
Improving. Likely reactive after recent PEG placement and pt also s/p Neupogen. Pt without any localizing symptoms aside from PEG tube site pain to suggest acute infection. Pt remains afebrile and VS stable.  - no need for infectious w/u at this time or empiric abx
Likely chemo induced. Pt without signs/symptom of acute infection. Resolved s/p receiving Neupogen on 8/17/21  - trend CBC with diff

## 2021-08-21 NOTE — PROGRESS NOTE ADULT - PROBLEM SELECTOR PROBLEM 1
Leukocytosis (leucocytosis)
Leukopenia
Leukocytosis (leucocytosis)
Odynophagia
Leukopenia
Odynophagia
Leukopenia
Leukopenia

## 2021-08-21 NOTE — PROGRESS NOTE ADULT - PROBLEM SELECTOR PROBLEM 3
Mild protein-calorie malnutrition
Odynophagia
Mild protein-calorie malnutrition
Odynophagia
Odynophagia

## 2021-08-21 NOTE — PROGRESS NOTE ADULT - PROBLEM SELECTOR PLAN 2
Pt with hypokalemia, hypophosphatemia and hypomagnesemia 2/2 not eating. Hypokalemia and hypomagnesemia resolved. Noted mild hyponatremia and hypophosphatemia  - will replete as needed
Chronic moderate exacerbation  Recently completed chemo/radiation  Heme onc/Rad onc consult in AM
Chronic moderate exacerbation  Recently completed chemo/radiation  Heme onc/Rad onc consult in AM
Pt with hypokalemia, hypophosphatemia and hypomagnesemia 2/2 not eating  - will replete with IV meds as needed  - repeat BMP with Mg, Phos s/p repletion
Pt with hypokalemia, hypophosphatemia and hypomagnesemia 2/2 not eating. Hyponatremia and hypokalemia resolved. Noted hypomagnesemia and hypophosphatemia  - will replete as needed
Pt with hypokalemia, hypophosphatemia and hypomagnesemia 2/2 not eating. Hyponatremia and hypokalemia resolved. Noted hypomagnesemia and hypophosphatemia  - will replete as needed
Pt with hypokalemia and hypomagnesemia 2/2 not eating  - will replete with IV meds  - repeat BMP with Mg, Phos tonight s/p repletion
Pt with hypokalemia, hypophosphatemia and hypomagnesemia 2/2 not eating  - will replete with IV meds as needed  - repeat BMP with Mg, Phos s/p repletion

## 2021-08-21 NOTE — PROGRESS NOTE ADULT - REASON FOR ADMISSION
odynophagia

## 2021-08-24 ENCOUNTER — APPOINTMENT (OUTPATIENT)
Dept: INFUSION THERAPY | Facility: HOSPITAL | Age: 71
End: 2021-08-24

## 2021-08-24 ENCOUNTER — NON-APPOINTMENT (OUTPATIENT)
Age: 71
End: 2021-08-24

## 2021-08-24 ENCOUNTER — APPOINTMENT (OUTPATIENT)
Dept: HEMATOLOGY ONCOLOGY | Facility: CLINIC | Age: 71
End: 2021-08-24

## 2021-08-25 ENCOUNTER — NON-APPOINTMENT (OUTPATIENT)
Age: 71
End: 2021-08-25

## 2021-08-27 ENCOUNTER — APPOINTMENT (OUTPATIENT)
Dept: HEMATOLOGY ONCOLOGY | Facility: CLINIC | Age: 71
End: 2021-08-27
Payer: MEDICARE

## 2021-08-27 ENCOUNTER — NON-APPOINTMENT (OUTPATIENT)
Age: 71
End: 2021-08-27

## 2021-08-27 ENCOUNTER — APPOINTMENT (OUTPATIENT)
Dept: INFUSION THERAPY | Facility: HOSPITAL | Age: 71
End: 2021-08-27

## 2021-08-27 ENCOUNTER — APPOINTMENT (OUTPATIENT)
Dept: HEMATOLOGY ONCOLOGY | Facility: CLINIC | Age: 71
End: 2021-08-27

## 2021-08-27 ENCOUNTER — OUTPATIENT (OUTPATIENT)
Dept: OUTPATIENT SERVICES | Facility: HOSPITAL | Age: 71
LOS: 1 days | End: 2021-08-27
Payer: MEDICARE

## 2021-08-27 ENCOUNTER — INPATIENT (INPATIENT)
Facility: HOSPITAL | Age: 71
LOS: 0 days | Discharge: HOME | End: 2021-08-28
Attending: THORACIC SURGERY (CARDIOTHORACIC VASCULAR SURGERY) | Admitting: THORACIC SURGERY (CARDIOTHORACIC VASCULAR SURGERY)
Payer: MEDICARE

## 2021-08-27 VITALS
OXYGEN SATURATION: 100 % | DIASTOLIC BLOOD PRESSURE: 70 MMHG | SYSTOLIC BLOOD PRESSURE: 136 MMHG | TEMPERATURE: 98 F | RESPIRATION RATE: 18 BRPM | HEART RATE: 125 BPM | HEIGHT: 72 IN

## 2021-08-27 VITALS
BODY MASS INDEX: 19.61 KG/M2 | SYSTOLIC BLOOD PRESSURE: 110 MMHG | RESPIRATION RATE: 1 BRPM | WEIGHT: 136.69 LBS | DIASTOLIC BLOOD PRESSURE: 80 MMHG | OXYGEN SATURATION: 99 % | TEMPERATURE: 98 F | HEART RATE: 102 BPM

## 2021-08-27 DIAGNOSIS — L02.211 CUTANEOUS ABSCESS OF ABDOMINAL WALL: ICD-10-CM

## 2021-08-27 DIAGNOSIS — C09.9 MALIGNANT NEOPLASM OF TONSIL, UNSPECIFIED: ICD-10-CM

## 2021-08-27 LAB
ALBUMIN SERPL ELPH-MCNC: 3.7 G/DL — SIGNIFICANT CHANGE UP (ref 3.3–5)
ALP SERPL-CCNC: 140 U/L — HIGH (ref 40–120)
ALT FLD-CCNC: 27 U/L — SIGNIFICANT CHANGE UP (ref 4–41)
ANION GAP SERPL CALC-SCNC: 17 MMOL/L — HIGH (ref 7–14)
ANISOCYTOSIS BLD QL: SIGNIFICANT CHANGE UP
AST SERPL-CCNC: 22 U/L — SIGNIFICANT CHANGE UP (ref 4–40)
BASE EXCESS BLDV CALC-SCNC: 3.8 MMOL/L — HIGH (ref -2–3)
BASOPHILS # BLD AUTO: 0 K/UL — SIGNIFICANT CHANGE UP (ref 0–0.2)
BASOPHILS NFR BLD AUTO: 0 % — SIGNIFICANT CHANGE UP (ref 0–2)
BILIRUB SERPL-MCNC: 0.5 MG/DL — SIGNIFICANT CHANGE UP (ref 0.2–1.2)
BLOOD GAS VENOUS COMPREHENSIVE RESULT: SIGNIFICANT CHANGE UP
BUN SERPL-MCNC: 32 MG/DL — HIGH (ref 7–23)
CALCIUM SERPL-MCNC: 9.6 MG/DL — SIGNIFICANT CHANGE UP (ref 8.4–10.5)
CHLORIDE BLDV-SCNC: 99 MMOL/L — SIGNIFICANT CHANGE UP (ref 96–108)
CHLORIDE SERPL-SCNC: 95 MMOL/L — LOW (ref 98–107)
CO2 BLDV-SCNC: 30.6 MMOL/L — HIGH (ref 22–26)
CO2 SERPL-SCNC: 22 MMOL/L — SIGNIFICANT CHANGE UP (ref 22–31)
CREAT SERPL-MCNC: 1.34 MG/DL — HIGH (ref 0.5–1.3)
EOSINOPHIL # BLD AUTO: 0 K/UL — SIGNIFICANT CHANGE UP (ref 0–0.5)
EOSINOPHIL NFR BLD AUTO: 0 % — SIGNIFICANT CHANGE UP (ref 0–6)
GAS PNL BLDV: 132 MMOL/L — LOW (ref 136–145)
GIANT PLATELETS BLD QL SMEAR: PRESENT — SIGNIFICANT CHANGE UP
GLUCOSE BLDV-MCNC: 101 MG/DL — HIGH (ref 70–99)
GLUCOSE SERPL-MCNC: 97 MG/DL — SIGNIFICANT CHANGE UP (ref 70–99)
HCO3 BLDV-SCNC: 29 MMOL/L — SIGNIFICANT CHANGE UP (ref 22–29)
HCT VFR BLD CALC: 23.6 % — LOW (ref 39–50)
HCT VFR BLDA CALC: 23 % — LOW (ref 39–51)
HGB BLD CALC-MCNC: 7.8 G/DL — LOW (ref 13–17)
HGB BLD-MCNC: 7.4 G/DL — LOW (ref 13–17)
HYPOCHROMIA BLD QL: SLIGHT — SIGNIFICANT CHANGE UP
IANC: 12.95 K/UL — HIGH (ref 1.5–8.5)
LACTATE BLDV-MCNC: 1.1 MMOL/L — SIGNIFICANT CHANGE UP (ref 0.5–2)
LYMPHOCYTES # BLD AUTO: 0.62 K/UL — LOW (ref 1–3.3)
LYMPHOCYTES # BLD AUTO: 3.5 % — LOW (ref 13–44)
MACROCYTES BLD QL: SIGNIFICANT CHANGE UP
MCHC RBC-ENTMCNC: 31.2 PG — SIGNIFICANT CHANGE UP (ref 27–34)
MCHC RBC-ENTMCNC: 31.4 GM/DL — LOW (ref 32–36)
MCV RBC AUTO: 99.6 FL — SIGNIFICANT CHANGE UP (ref 80–100)
METAMYELOCYTES # FLD: 4.3 % — HIGH (ref 0–1)
MONOCYTES # BLD AUTO: 0.93 K/UL — HIGH (ref 0–0.9)
MONOCYTES NFR BLD AUTO: 5.2 % — SIGNIFICANT CHANGE UP (ref 2–14)
MYELOCYTES NFR BLD: 11.3 % — HIGH (ref 0–0)
NEUTROPHILS # BLD AUTO: 13.47 K/UL — HIGH (ref 1.8–7.4)
NEUTROPHILS NFR BLD AUTO: 75.7 % — SIGNIFICANT CHANGE UP (ref 43–77)
NRBC # BLD: 1 /100 — HIGH (ref 0–0)
PCO2 BLDV: 48 MMHG — SIGNIFICANT CHANGE UP (ref 42–55)
PH BLDV: 7.39 — SIGNIFICANT CHANGE UP (ref 7.32–7.43)
PLAT MORPH BLD: NORMAL — SIGNIFICANT CHANGE UP
PLATELET # BLD AUTO: 565 K/UL — HIGH (ref 150–400)
PLATELET COUNT - ESTIMATE: ABNORMAL
PO2 BLDV: <20 MMHG — SIGNIFICANT CHANGE UP
POLYCHROMASIA BLD QL SMEAR: SLIGHT — SIGNIFICANT CHANGE UP
POTASSIUM BLDV-SCNC: 5 MMOL/L — SIGNIFICANT CHANGE UP (ref 3.5–5.1)
POTASSIUM SERPL-MCNC: 5.3 MMOL/L — SIGNIFICANT CHANGE UP (ref 3.5–5.3)
POTASSIUM SERPL-SCNC: 5.3 MMOL/L — SIGNIFICANT CHANGE UP (ref 3.5–5.3)
PROT SERPL-MCNC: 7.3 G/DL — SIGNIFICANT CHANGE UP (ref 6–8.3)
RBC # BLD: 2.37 M/UL — LOW (ref 4.2–5.8)
RBC # FLD: 16.2 % — HIGH (ref 10.3–14.5)
RBC BLD AUTO: ABNORMAL
SAO2 % BLDV: 23.5 % — SIGNIFICANT CHANGE UP
SARS-COV-2 RNA SPEC QL NAA+PROBE: SIGNIFICANT CHANGE UP
SODIUM SERPL-SCNC: 134 MMOL/L — LOW (ref 135–145)
WBC # BLD: 17.8 K/UL — HIGH (ref 3.8–10.5)
WBC # FLD AUTO: 17.8 K/UL — HIGH (ref 3.8–10.5)

## 2021-08-27 PROCEDURE — 99285 EMERGENCY DEPT VISIT HI MDM: CPT

## 2021-08-27 PROCEDURE — 74176 CT ABD & PELVIS W/O CONTRAST: CPT | Mod: 26

## 2021-08-27 PROCEDURE — 99215 OFFICE O/P EST HI 40 MIN: CPT

## 2021-08-27 RX ORDER — SODIUM CHLORIDE 9 MG/ML
2000 INJECTION INTRAMUSCULAR; INTRAVENOUS; SUBCUTANEOUS ONCE
Refills: 0 | Status: COMPLETED | OUTPATIENT
Start: 2021-08-27 | End: 2021-08-27

## 2021-08-27 RX ORDER — ACETAMINOPHEN 500 MG
1000 TABLET ORAL ONCE
Refills: 0 | Status: COMPLETED | OUTPATIENT
Start: 2021-08-27 | End: 2021-08-27

## 2021-08-27 RX ORDER — LIDOCAINE 4 G/100G
15 CREAM TOPICAL EVERY 4 HOURS
Refills: 0 | Status: DISCONTINUED | OUTPATIENT
Start: 2021-08-27 | End: 2021-08-28

## 2021-08-27 RX ORDER — CEFAZOLIN SODIUM 1 G
2000 VIAL (EA) INJECTION EVERY 8 HOURS
Refills: 0 | Status: DISCONTINUED | OUTPATIENT
Start: 2021-08-27 | End: 2021-08-27

## 2021-08-27 RX ORDER — HEPARIN SODIUM 5000 [USP'U]/ML
5000 INJECTION INTRAVENOUS; SUBCUTANEOUS EVERY 8 HOURS
Refills: 0 | Status: DISCONTINUED | OUTPATIENT
Start: 2021-08-27 | End: 2021-08-28

## 2021-08-27 RX ORDER — CEFAZOLIN SODIUM 1 G
1000 VIAL (EA) INJECTION EVERY 8 HOURS
Refills: 0 | Status: DISCONTINUED | OUTPATIENT
Start: 2021-08-27 | End: 2021-08-28

## 2021-08-27 RX ORDER — CEFEPIME 1 G/1
2000 INJECTION, POWDER, FOR SOLUTION INTRAMUSCULAR; INTRAVENOUS ONCE
Refills: 0 | Status: COMPLETED | OUTPATIENT
Start: 2021-08-27 | End: 2021-08-27

## 2021-08-27 RX ADMIN — Medication 400 MILLIGRAM(S): at 18:17

## 2021-08-27 RX ADMIN — CEFEPIME 100 MILLIGRAM(S): 1 INJECTION, POWDER, FOR SOLUTION INTRAMUSCULAR; INTRAVENOUS at 19:29

## 2021-08-27 RX ADMIN — HEPARIN SODIUM 5000 UNIT(S): 5000 INJECTION INTRAVENOUS; SUBCUTANEOUS at 19:28

## 2021-08-27 RX ADMIN — SODIUM CHLORIDE 1000 MILLILITER(S): 9 INJECTION INTRAMUSCULAR; INTRAVENOUS; SUBCUTANEOUS at 18:17

## 2021-08-27 NOTE — ED PROVIDER NOTE - OBJECTIVE STATEMENT
70 yo m with PMH throat ca s/p chemo and radiation, dysphagia with PEG presents with pain and drainage from PEG site. Reports tube was placed 8/18. He's had pain since insertion, but noted increased pain and was prescribed keflex on Wednesday. He was seen by oncology today who recommended he be evaluated in ED. Denies fever, chills, CP, SOB. n/v/d.

## 2021-08-27 NOTE — ED ADULT TRIAGE NOTE - CHIEF COMPLAINT QUOTE
Pt sent from [facility] for PEG tube site ing=fection.  Pt states that he has been having pain and drainage to site, was placed on Keflex without improvement.  PMH: throat cancer not currently on treatment, PEG

## 2021-08-27 NOTE — ED PROVIDER NOTE - CLINICAL SUMMARY MEDICAL DECISION MAKING FREE TEXT BOX
72 yo m with PMH throat ca s/p chemo and radiation, dysphagia with PEG presents with pain and drainage from PEG site. On exam, area of warmth, redness, tenderness and induration around L abd surrounding peg tube measuring ~3x6cm. 70 yo m with PMH throat ca s/p chemo and radiation, dysphagia with PEG presents with pain and drainage from PEG site. On exam, area of warmth, redness, tenderness and induration around L abd surrounding peg tube measuring ~3x6cm. Will order labs and determine best imaging for abscess.

## 2021-08-27 NOTE — H&P ADULT - ASSESSMENT
Assessment: 71M w/ PMHx of throat cancer s/p PEG tube placement on 8/18 presents after being sent in from the Thoracic Surgery office with concern of clogged PEG tube, erythema and drainage around the PEG tube, and abdominal pain. Pt was prescribed a 3 day course of Keflex (started on 8/25 for 3 total days) but the abdominal pain and drainage has been worsening. PEG tube placement occurred in the setting of severe protein calorie malnutrition. Today, pt denies fever/chills, CP, SOB, nausea, vomiting or changes in bowel movements. Pt states he has been tolerating TF. Pt has no other concerns. Thoracic Surgery consulted over concerns of PEG tube.    PLAN:  - Pt was seen, examined and discussed with Dr. Michel  - PEG tube flushed easily  - Mild serosang drainage present around PEG tube in addition to TTP at insertion site  - Recommend CT Abdomen and Pelvis with contrast through the PEG tube. NO CONTRAST ORAL NOR IV  - Further considerations pending CT scan results    Pt was seen and examined with Harjinder Cliffodr MD, PGY-6    Onur Solano MD, PGY-2  Department of Cardiothoracic Surgery  Spectra [phone]

## 2021-08-27 NOTE — CONSULT NOTE ADULT - ASSESSMENT
Assessment: 71M w/ PMHx of throat cancer s/p PEG tube placement on 8/18 presents after being sent in from the Thoracic Surgery office with concern of clogged PEG tube, erythema and drainage around the PEG tube, and abdominal pain. Pt was prescribed a 3 day course of Keflex (started on 8/25 for 3 total days) but the abdominal pain and drainage has been worsening. PEG tube placement occurred in the setting of severe protein calorie malnutrition. Today, pt denies fever/chills, CP, SOB, nausea, vomiting or changes in bowel movements. Pt states he has been tolerating TF. Pt has no other concerns. Thoracic Surgery consulted over concerns of PEG tube.    PLAN:  - Pt was seen, examined and discussed with Dr. Michel  - PEG tube flushed easily  - Mild serosang drainage present around PEG tube in addition to TTP at insertion site  - Recommend CT Abdomen and Pelvis with contrast through the PEG tube. NO CONTRAST ORAL NOR IV  - Further considerations pending CT scan results    Pt was seen and examined with Harjinder Clifford MD, PGY-6    Onur Solano MD, PGY-2  Department of Cardiothoracic Surgery  Spectra [phone]

## 2021-08-27 NOTE — ED PROVIDER NOTE - SKIN COLOR
area of warmth, redness, tenderness and induration around L abd surrounding peg tube measuring ~3x6cm

## 2021-08-27 NOTE — ED ADULT NURSE NOTE - ALCOHOL PRE SCREEN (AUDIT - C)
Dr Dominguez called and notifed of pt arrival and complaint. New orders received to discharge pt home with Macrobid 100mg PO BID #14, and send urine for culture.    Statement Selected

## 2021-08-27 NOTE — CONSULT NOTE ADULT - SUBJECTIVE AND OBJECTIVE BOX
HPI:  71M w/ PMHx of throat cancer s/p PEG tube placement on 8/18 presents after being sent in from the Thoracic Surgery office with concern of clogged PEG tube, erythema and drainage around the PEG tube, and abdominal pain. Pt was prescribed a 3 day course of Keflex (started on 8/25 for 3 total days) but the abdominal pain and drainage has been worsening. PEG tube placement occurred in the setting of severe protein calorie malnutrition. Today, pt denies fever/chills, CP, SOB, nausea, vomiting or changes in bowel movements. Pt states he has been tolerating TF. Pt has no other concerns.    PAST MEDICAL & SURGICAL HISTORY:  Oropharynx cancer    No significant past surgical history        REVIEW OF SYSTEMS    10 point review of systems was assessed and is unremarkable other than what was mentioned in the HPI    MEDICATIONS  (STANDING):    MEDICATIONS  (PRN):      Allergies    No Known Allergies    Intolerances      FAMILY HISTORY:  No pertinent family history in first degree relatives      unless noted, no significant family hx with Mother, Father, Siblings    Vital Signs Last 24 Hrs  T(C): 36.4 (27 Aug 2021 14:53), Max: 36.4 (27 Aug 2021 14:53)  T(F): 97.5 (27 Aug 2021 14:53), Max: 97.5 (27 Aug 2021 14:53)  HR: 125 (27 Aug 2021 14:53) (125 - 125)  BP: 136/70 (27 Aug 2021 14:53) (136/70 - 136/70)  BP(mean): --  RR: 18 (27 Aug 2021 14:53) (18 - 18)  SpO2: 100% (27 Aug 2021 14:53) (100% - 100%)    General: WN/WD NAD  Neurology: Awake, nonfocal, FRANKLIN x 4  Eyes: Scleras clear, PERRLA/ EOMI, Gross vision intact  ENT: Gross hearing intact, grossly patent pharynx, no stridor  Neck: No JVD  Respiratory: Non labored breath sounds  CV: RRR  Abdominal: Soft, ND, tender to palpation around PEG tube site. Mild serosang drainage around PEG tube. Dressing placed  Extremities: No edema  Skin: No Rashes, Hematoma, Ecchymosis  Psych: Oriented x 3, normal affect    LABS:                RADIOLOGY & ADDITIONAL STUDIES:

## 2021-08-27 NOTE — ED ADULT NURSE NOTE - OBJECTIVE STATEMENT
Pt received to room 26. Pt comes to ED c/o pain & drainage from PEG site. Pt has throat CA, reports PEG tube was replaced on 8/18. States he has had increased pain on insertion site and was prescribed Keflex this past Wednesday 8/25. Pt denies chest pain, dyspnea, N/V/D, chills, fever, weakness, dizziness, headache, palpitations, cough. Respirations are even & unlabored, lung sounds clear, heart sounds normal, abdomen is soft, non-distended. Pt is A&Ox3, ambulates independently. Side rails up for safety, bed in lowest position, call bell within reach. Pt received to room 26. Pt comes to ED c/o pain & drainage from PEG site. Pt has throat CA, reports PEG tube was replaced on 8/18. States he has had increased pain on insertion site and was prescribed Keflex this past Wednesday 8/25. White/yellow thick discharge noted coming out of PEG site insertion. Skin is clean, dry and intact around PEG site insertion. Pt denies chest pain, dyspnea, N/V/D, chills, fever, weakness, dizziness, headache, palpitations, cough. Respirations are even & unlabored, lung sounds clear, heart sounds normal, abdomen is soft, non-distended. Pt is A&Ox3, ambulates independently. Side rails up for safety, bed in lowest position, call bell within reach.

## 2021-08-27 NOTE — ED PROVIDER NOTE - PROGRESS NOTE DETAILS
First u/s guided line infiltrated - removed - new line placed, VBG drawn off u/s-guided line after ~500cc and partial abx administered.  Admitted to thoracic surgery HR elevated. WBC resulted: 17.80. IVF resuscitation & ABX ordered. Will attempt VBG from u/s-placed IV line.

## 2021-08-27 NOTE — ED ADULT NURSE NOTE - CHIEF COMPLAINT QUOTE
Pt sent from [facility] for PEG tube site ing=fection.  Pt states that he has been having pain and drainage to site, was placed on Keflex without improvement.  PMH: throat cancer not currently on treatment, PEG

## 2021-08-27 NOTE — ED PROVIDER NOTE - ATTENDING CONTRIBUTION TO CARE
I (Adis) agree with above, I performed a history and physical. Counseled nazario medical staff, physician assistant, and/or medical student on medical decision making as documented. Medical decisions and treatment interventions were made in real time during the patient encounter. Additionally and/or with the following exceptions: Patient presented with erythema and pain to the peg site. He had prakash induration medial to the peg site (~6cm radius), and was tender to palpation. VSS, no septic, Covered with abx for presumed infection. CT surgery consulted for evaluation and definitive managemen. CT scan had no drainable pocked. Admitted for IV abx.

## 2021-08-28 ENCOUNTER — APPOINTMENT (OUTPATIENT)
Dept: INFUSION THERAPY | Facility: HOSPITAL | Age: 71
End: 2021-08-28

## 2021-08-28 ENCOUNTER — TRANSCRIPTION ENCOUNTER (OUTPATIENT)
Age: 71
End: 2021-08-28

## 2021-08-28 VITALS
SYSTOLIC BLOOD PRESSURE: 107 MMHG | OXYGEN SATURATION: 100 % | RESPIRATION RATE: 16 BRPM | TEMPERATURE: 98 F | DIASTOLIC BLOOD PRESSURE: 68 MMHG | HEART RATE: 100 BPM

## 2021-08-28 RX ORDER — CEPHALEXIN 500 MG
5 CAPSULE ORAL
Qty: 70 | Refills: 0
Start: 2021-08-28 | End: 2021-09-03

## 2021-08-28 RX ADMIN — HEPARIN SODIUM 5000 UNIT(S): 5000 INJECTION INTRAVENOUS; SUBCUTANEOUS at 06:36

## 2021-08-28 RX ADMIN — Medication 100 MILLIGRAM(S): at 06:37

## 2021-08-28 NOTE — DISCHARGE NOTE PROVIDER - NSDCFUSCHEDAPPT_GEN_ALL_CORE_FT
CATARINO THAYER ; 10/06/2021 ; P [facility] [address] [facility] [place]

## 2021-08-28 NOTE — DISCHARGE NOTE PROVIDER - CARE PROVIDER_API CALL
Mamadou Peñaloza)  Surgery; Thoracic Surgery  [address] [place] [address], [place] [address]  Phone: [phone]  Fax: [phone]  Follow Up Time:

## 2021-08-28 NOTE — DISCHARGE NOTE NURSING/CASE MANAGEMENT/SOCIAL WORK - PATIENT PORTAL LINK FT
You can access the FollowMyHealth Patient Portal offered by [facility] by registering at the following website: [email or web address]. By joining [facility]’s FollowMyHealth portal, you will also be able to view your health information using other applications (apps) compatible with our system.

## 2021-08-28 NOTE — DISCHARGE NOTE PROVIDER - NSDCMRMEDTOKEN_GEN_ALL_CORE_FT
cephalexin 250 mg/5 mL oral liquid: 5 milliliter(s) orally every 12 hours   melatonin 3 mg oral tablet: 1 tab(s) orally once a day (at bedtime), As needed, Insomnia

## 2021-08-28 NOTE — DISCHARGE NOTE PROVIDER - HOSPITAL COURSE
71M w/ PMHx of throat cancer s/p PEG tube placement on 8/18 presents after being sent in from the Thoracic Surgery office with concern of clogged PEG tube, erythema and drainage around the PEG tube, and abdominal pain. Pt was prescribed a 3 day course of Keflex (started on 8/25 for 3 total days) but the abdominal pain and drainage has been worsening. PEG tube placement occurred in the setting of severe protein calorie malnutrition. Today, pt denies fever/chills, CP, SOB, nausea, vomiting or changes in bowel movements. Pt states he has been tolerating TF. Pt has no other concerns. PT received IV antibiotics and is now stable for discharge home on oral antibiotic regimen.    Vital Signs Last 24 Hrs  T(C): 36.8 (28 Aug 2021 08:48), Max: 37.1 (27 Aug 2021 17:33)  T(F): 98.3 (28 Aug 2021 08:48), Max: 98.8 (27 Aug 2021 17:33)  HR: 100 (28 Aug 2021 08:48) (92 - 125)  BP: 107/68 (28 Aug 2021 08:48) (103/65 - 136/70)  BP(mean): 97 (27 Aug 2021 16:29) (97 - 97)  RR: 16 (28 Aug 2021 08:48) (16 - 20)  SpO2: 100% (28 Aug 2021 08:48) (96% - 100%)      PHYSICAL EXAM  General: WN/WD NAD  Neurology: Awake, nonfocal, FRANKLIN x 4  Eyes: Scleras clear, PERRLA/ EOMI, Gross vision intact  ENT: Gross hearing intact, grossly patent pharynx, no stridor  Neck: No JVD  Respiratory: Non labored breath sounds  CV: RRR  Abdominal: Soft, ND, tender to palpation around PEG tube site. Mild serosang drainage around PEG tube. Dressing placed  Extremities: No edema  Skin: No Rashes, Hematoma, Ecchymosis  Psych: Oriented x 3, normal affect

## 2021-08-30 ENCOUNTER — NON-APPOINTMENT (OUTPATIENT)
Age: 71
End: 2021-08-30

## 2021-08-31 ENCOUNTER — RESULT REVIEW (OUTPATIENT)
Age: 71
End: 2021-08-31

## 2021-08-31 ENCOUNTER — APPOINTMENT (OUTPATIENT)
Dept: HEMATOLOGY ONCOLOGY | Facility: CLINIC | Age: 71
End: 2021-08-31

## 2021-08-31 LAB
ANISOCYTOSIS BLD QL: SLIGHT — SIGNIFICANT CHANGE UP
BASOPHILS # BLD AUTO: 0 K/UL — SIGNIFICANT CHANGE UP (ref 0–0.2)
BASOPHILS NFR BLD AUTO: 0 % — SIGNIFICANT CHANGE UP (ref 0–2)
EOSINOPHIL # BLD AUTO: 0 K/UL — SIGNIFICANT CHANGE UP (ref 0–0.5)
EOSINOPHIL NFR BLD AUTO: 0 % — SIGNIFICANT CHANGE UP (ref 0–6)
HCT VFR BLD CALC: 24 % — LOW (ref 39–50)
HGB BLD-MCNC: 7.4 G/DL — LOW (ref 13–17)
HYPOCHROMIA BLD QL: SLIGHT — SIGNIFICANT CHANGE UP
LG PLATELETS BLD QL AUTO: SLIGHT — SIGNIFICANT CHANGE UP
LYMPHOCYTES # BLD AUTO: 0.57 K/UL — LOW (ref 1–3.3)
LYMPHOCYTES # BLD AUTO: 5 % — LOW (ref 13–44)
MACROCYTES BLD QL: SLIGHT — SIGNIFICANT CHANGE UP
MCHC RBC-ENTMCNC: 30.8 G/DL — LOW (ref 32–36)
MCHC RBC-ENTMCNC: 32.6 PG — SIGNIFICANT CHANGE UP (ref 27–34)
MCV RBC AUTO: 105.7 FL — HIGH (ref 80–100)
MONOCYTES # BLD AUTO: 0.69 K/UL — SIGNIFICANT CHANGE UP (ref 0–0.9)
MONOCYTES NFR BLD AUTO: 6 % — SIGNIFICANT CHANGE UP (ref 2–14)
MYELOCYTES NFR BLD: 4 % — HIGH (ref 0–0)
NEUTROPHILS # BLD AUTO: 9.73 K/UL — HIGH (ref 1.8–7.4)
NEUTROPHILS NFR BLD AUTO: 84 % — HIGH (ref 43–77)
NEUTS BAND # BLD: 1 % — SIGNIFICANT CHANGE UP (ref 0–8)
NRBC # BLD: 0 /100 — SIGNIFICANT CHANGE UP (ref 0–0)
NRBC # BLD: SIGNIFICANT CHANGE UP /100 WBCS (ref 0–0)
PLAT MORPH BLD: NORMAL — SIGNIFICANT CHANGE UP
PLATELET # BLD AUTO: 668 K/UL — HIGH (ref 150–400)
POLYCHROMASIA BLD QL SMEAR: SLIGHT — SIGNIFICANT CHANGE UP
RBC # BLD: 2.27 M/UL — LOW (ref 4.2–5.8)
RBC # FLD: 17.4 % — HIGH (ref 10.3–14.5)
RBC BLD AUTO: ABNORMAL
WBC # BLD: 11.45 K/UL — HIGH (ref 3.8–10.5)
WBC # FLD AUTO: 11.45 K/UL — HIGH (ref 3.8–10.5)

## 2021-09-01 ENCOUNTER — NON-APPOINTMENT (OUTPATIENT)
Age: 71
End: 2021-09-01

## 2021-09-01 LAB
ANION GAP SERPL CALC-SCNC: 13 MMOL/L
BUN SERPL-MCNC: 27 MG/DL
CALCIUM SERPL-MCNC: 9.4 MG/DL
CHLORIDE SERPL-SCNC: 101 MMOL/L
CO2 SERPL-SCNC: 25 MMOL/L
CREAT SERPL-MCNC: 1.31 MG/DL
CULTURE RESULTS: SIGNIFICANT CHANGE UP
CULTURE RESULTS: SIGNIFICANT CHANGE UP
GLUCOSE SERPL-MCNC: 96 MG/DL
POTASSIUM SERPL-SCNC: 5.4 MMOL/L
SODIUM SERPL-SCNC: 139 MMOL/L
SPECIMEN SOURCE: SIGNIFICANT CHANGE UP
SPECIMEN SOURCE: SIGNIFICANT CHANGE UP

## 2021-09-02 ENCOUNTER — APPOINTMENT (OUTPATIENT)
Dept: INFUSION THERAPY | Facility: HOSPITAL | Age: 71
End: 2021-09-02

## 2021-09-02 ENCOUNTER — APPOINTMENT (OUTPATIENT)
Dept: HEMATOLOGY ONCOLOGY | Facility: CLINIC | Age: 71
End: 2021-09-02
Payer: MEDICARE

## 2021-09-02 DIAGNOSIS — L08.9 LOCAL INFECTION OF THE SKIN AND SUBCUTANEOUS TISSUE, UNSPECIFIED: ICD-10-CM

## 2021-09-02 PROCEDURE — 99214 OFFICE O/P EST MOD 30 MIN: CPT

## 2021-09-04 ENCOUNTER — APPOINTMENT (OUTPATIENT)
Dept: INFUSION THERAPY | Facility: HOSPITAL | Age: 71
End: 2021-09-04

## 2021-09-04 ENCOUNTER — LABORATORY RESULT (OUTPATIENT)
Age: 71
End: 2021-09-04

## 2021-09-07 DIAGNOSIS — E86.0 DEHYDRATION: ICD-10-CM

## 2021-09-13 PROBLEM — L08.9 SKIN INFECTION: Status: ACTIVE | Noted: 2021-08-25

## 2021-09-14 ENCOUNTER — NON-APPOINTMENT (OUTPATIENT)
Age: 71
End: 2021-09-14

## 2021-09-15 ENCOUNTER — RESULT REVIEW (OUTPATIENT)
Age: 71
End: 2021-09-15

## 2021-09-15 ENCOUNTER — LABORATORY RESULT (OUTPATIENT)
Age: 71
End: 2021-09-15

## 2021-09-15 ENCOUNTER — APPOINTMENT (OUTPATIENT)
Dept: HEMATOLOGY ONCOLOGY | Facility: CLINIC | Age: 71
End: 2021-09-15
Payer: MEDICARE

## 2021-09-15 ENCOUNTER — APPOINTMENT (OUTPATIENT)
Dept: INFUSION THERAPY | Facility: HOSPITAL | Age: 71
End: 2021-09-15

## 2021-09-15 LAB
BASOPHILS # BLD AUTO: 0.01 K/UL — SIGNIFICANT CHANGE UP (ref 0–0.2)
BASOPHILS NFR BLD AUTO: 0.2 % — SIGNIFICANT CHANGE UP (ref 0–2)
EOSINOPHIL # BLD AUTO: 0.15 K/UL — SIGNIFICANT CHANGE UP (ref 0–0.5)
EOSINOPHIL NFR BLD AUTO: 2.6 % — SIGNIFICANT CHANGE UP (ref 0–6)
HCT VFR BLD CALC: 26.8 % — LOW (ref 39–50)
HGB BLD-MCNC: 8.6 G/DL — LOW (ref 13–17)
IMM GRANULOCYTES NFR BLD AUTO: 0.5 % — SIGNIFICANT CHANGE UP (ref 0–1.5)
LYMPHOCYTES # BLD AUTO: 0.84 K/UL — LOW (ref 1–3.3)
LYMPHOCYTES # BLD AUTO: 14.8 % — SIGNIFICANT CHANGE UP (ref 13–44)
MCHC RBC-ENTMCNC: 32.1 G/DL — SIGNIFICANT CHANGE UP (ref 32–36)
MCHC RBC-ENTMCNC: 32.8 PG — SIGNIFICANT CHANGE UP (ref 27–34)
MCV RBC AUTO: 102.3 FL — HIGH (ref 80–100)
MONOCYTES # BLD AUTO: 0.44 K/UL — SIGNIFICANT CHANGE UP (ref 0–0.9)
MONOCYTES NFR BLD AUTO: 7.7 % — SIGNIFICANT CHANGE UP (ref 2–14)
NEUTROPHILS # BLD AUTO: 4.21 K/UL — SIGNIFICANT CHANGE UP (ref 1.8–7.4)
NEUTROPHILS NFR BLD AUTO: 74.2 % — SIGNIFICANT CHANGE UP (ref 43–77)
NRBC # BLD: 0 /100 WBCS — SIGNIFICANT CHANGE UP (ref 0–0)
PLATELET # BLD AUTO: 168 K/UL — SIGNIFICANT CHANGE UP (ref 150–400)
RBC # BLD: 2.62 M/UL — LOW (ref 4.2–5.8)
RBC # FLD: 15.5 % — HIGH (ref 10.3–14.5)
WBC # BLD: 5.68 K/UL — SIGNIFICANT CHANGE UP (ref 3.8–10.5)
WBC # FLD AUTO: 5.68 K/UL — SIGNIFICANT CHANGE UP (ref 3.8–10.5)

## 2021-09-15 PROCEDURE — 99214 OFFICE O/P EST MOD 30 MIN: CPT

## 2021-09-16 ENCOUNTER — NON-APPOINTMENT (OUTPATIENT)
Age: 71
End: 2021-09-16

## 2021-09-17 NOTE — ASSESSMENT
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  Began chemotherapy with weekly Cisplatin concurrent with RT on 6/25/21. Changed to Carbo/Taxol with C6 due to ZORAIDA\par Completed chemo/RT in Mid August 2021. \par \par -Completed chemo on 8/6/21.\par -RT completed: 6/25/21-8/12/21\par -PEG placed 8/18/21\par -Constipation:  continue appropriate bowel regimen\par -F/U with Rad/Onc prn.\par -IVF/follow up/labs  scheduled weekly x next 2 weeks to assess recovery from Chemo/RT \par -Encouraged to call with any further issues\par \par \par \par

## 2021-09-17 NOTE — PHYSICAL EXAM
[Restricted in physically strenuous activity but ambulatory and able to carry out work of a light or sedentary nature] : Status 1- Restricted in physically strenuous activity but ambulatory and able to carry out work of a light or sedentary nature, e.g., light house work, office work [Normal] : affect appropriate [de-identified] : No icterus [de-identified] : MMM, + ulcerated left tonsillar mass [de-identified] : left cervical LAD [de-identified] : Clear [de-identified] : S1 S2 [de-identified] : No edema [de-identified] : No spine/CVA tenderness [de-identified] : Ambulatory [de-identified] : PEG site C/D/I

## 2021-09-17 NOTE — HISTORY OF PRESENT ILLNESS
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  He sought medical attention and was put on antibiotics at first.  When symptoms were not improving, he was referred to ENT in March who noted a tonsillar/base of tongue mass.  He was sent for a CT scan of his neck revealing a 3 cm mass spanning the left base of tongue and left oropharyngeal tonsils with ipsilateral left-sided level 2 lymph nodes measuring up to 3.8 cm.  Biopsy of the mass revealed squamous cell carcinoma, p16 positive. Started chemotherapy with weekly Cisplatin concurrent with RT on 6/25/21. Chemo was changed to Carbo/Taxol due to ZORAIDA with C6; he completed C7  in early August 2021. RT was completed on 8/12/21. \par \par  [de-identified] : -Left tonsillar mass 4/28/2021: Squamous cell carcinoma.  IHC for p16 is positive.   [de-identified] : Patient completed final C7 of chemotherapy in early Aug 6, 2021; Cisplatin was changed to Carbo/Taxol with C6 due to ZORAIDA. .   \par RT was completed on 8/12/1. \par PEG tube placed on 8/18/21. \par \par Patient seen in the treatment room today prior to IVF hydration. He is feeling improved following 1U PRBC for HGB 7.4 on 9/2 and additional IVF on 9/4. He no longer has pain at his PEG site and reports that feeds are going well. Denies D/C. Has tried sips of water and ice cream but relies on the PEG for basically all nutrition and meds. \par \par \par

## 2021-09-17 NOTE — RESULTS/DATA
[FreeTextEntry1] : -CT neck 4/26/2021: Left oropharyngeal malignancy measuring 3.0 x 2.8 x 2.4 cm with ipsilateral left level 2 lymph nodes measuring up to 3.8 cm.  \par \par -PET/CT 5/27/21: 1. FDG avid mass in the left base of tongue involving the left tonsil, and glossotonsillar sulcus with FDG avid left level 2/3 lymph nodes, consistent with squamous cell carcinoma with regional yamel metastatic disease.\par 2. FDG avid focus in the left nasopharynx, separate from the left tongue base mass, indeterminate. Suggest correlation with direct inspection.\par 3. No evidence of FDG avid distant metastatic disease.\par \par Labs from hospitalization 8/21: ~WBC 13.35  ~H/H 7.2/22.5  ~Sodium 137  ~Creatinine 1.12  ~Calcium 8.3\par

## 2021-09-20 ENCOUNTER — OUTPATIENT (OUTPATIENT)
Dept: OUTPATIENT SERVICES | Facility: HOSPITAL | Age: 71
LOS: 1 days | Discharge: HOME | End: 2021-09-20

## 2021-09-20 DIAGNOSIS — C09.9 MALIGNANT NEOPLASM OF TONSIL, UNSPECIFIED: ICD-10-CM

## 2021-09-23 ENCOUNTER — APPOINTMENT (OUTPATIENT)
Dept: INFUSION THERAPY | Facility: HOSPITAL | Age: 71
End: 2021-09-23

## 2021-09-23 ENCOUNTER — APPOINTMENT (OUTPATIENT)
Dept: HEMATOLOGY ONCOLOGY | Facility: CLINIC | Age: 71
End: 2021-09-23

## 2021-09-23 ENCOUNTER — NON-APPOINTMENT (OUTPATIENT)
Age: 71
End: 2021-09-23

## 2021-09-23 ENCOUNTER — LABORATORY RESULT (OUTPATIENT)
Age: 71
End: 2021-09-23

## 2021-09-30 ENCOUNTER — RESULT REVIEW (OUTPATIENT)
Age: 71
End: 2021-09-30

## 2021-09-30 ENCOUNTER — APPOINTMENT (OUTPATIENT)
Dept: INFUSION THERAPY | Facility: HOSPITAL | Age: 71
End: 2021-09-30

## 2021-09-30 ENCOUNTER — LABORATORY RESULT (OUTPATIENT)
Age: 71
End: 2021-09-30

## 2021-09-30 ENCOUNTER — APPOINTMENT (OUTPATIENT)
Dept: HEMATOLOGY ONCOLOGY | Facility: CLINIC | Age: 71
End: 2021-09-30
Payer: MEDICARE

## 2021-09-30 DIAGNOSIS — E86.0 DEHYDRATION: ICD-10-CM

## 2021-09-30 LAB
BASOPHILS # BLD AUTO: 0.02 K/UL — SIGNIFICANT CHANGE UP (ref 0–0.2)
BASOPHILS NFR BLD AUTO: 0.4 % — SIGNIFICANT CHANGE UP (ref 0–2)
EOSINOPHIL # BLD AUTO: 0.15 K/UL — SIGNIFICANT CHANGE UP (ref 0–0.5)
EOSINOPHIL NFR BLD AUTO: 2.6 % — SIGNIFICANT CHANGE UP (ref 0–6)
HCT VFR BLD CALC: 33.2 % — LOW (ref 39–50)
HGB BLD-MCNC: 10.4 G/DL — LOW (ref 13–17)
IMM GRANULOCYTES NFR BLD AUTO: 0.5 % — SIGNIFICANT CHANGE UP (ref 0–1.5)
LYMPHOCYTES # BLD AUTO: 0.79 K/UL — LOW (ref 1–3.3)
LYMPHOCYTES # BLD AUTO: 13.8 % — SIGNIFICANT CHANGE UP (ref 13–44)
MCHC RBC-ENTMCNC: 31.3 G/DL — LOW (ref 32–36)
MCHC RBC-ENTMCNC: 31.3 PG — SIGNIFICANT CHANGE UP (ref 27–34)
MCV RBC AUTO: 100 FL — SIGNIFICANT CHANGE UP (ref 80–100)
MONOCYTES # BLD AUTO: 0.38 K/UL — SIGNIFICANT CHANGE UP (ref 0–0.9)
MONOCYTES NFR BLD AUTO: 6.7 % — SIGNIFICANT CHANGE UP (ref 2–14)
NEUTROPHILS # BLD AUTO: 4.34 K/UL — SIGNIFICANT CHANGE UP (ref 1.8–7.4)
NEUTROPHILS NFR BLD AUTO: 76 % — SIGNIFICANT CHANGE UP (ref 43–77)
NRBC # BLD: 0 /100 WBCS — SIGNIFICANT CHANGE UP (ref 0–0)
PLATELET # BLD AUTO: 182 K/UL — SIGNIFICANT CHANGE UP (ref 150–400)
RBC # BLD: 3.32 M/UL — LOW (ref 4.2–5.8)
RBC # FLD: 14.2 % — SIGNIFICANT CHANGE UP (ref 10.3–14.5)
WBC # BLD: 5.71 K/UL — SIGNIFICANT CHANGE UP (ref 3.8–10.5)
WBC # FLD AUTO: 5.71 K/UL — SIGNIFICANT CHANGE UP (ref 3.8–10.5)

## 2021-09-30 PROCEDURE — 86923 COMPATIBILITY TEST ELECTRIC: CPT

## 2021-09-30 PROCEDURE — 86850 RBC ANTIBODY SCREEN: CPT

## 2021-09-30 PROCEDURE — 86901 BLOOD TYPING SEROLOGIC RH(D): CPT

## 2021-09-30 PROCEDURE — 99215 OFFICE O/P EST HI 40 MIN: CPT

## 2021-09-30 PROCEDURE — 86900 BLOOD TYPING SEROLOGIC ABO: CPT

## 2021-10-01 ENCOUNTER — NON-APPOINTMENT (OUTPATIENT)
Age: 71
End: 2021-10-01

## 2021-10-05 ENCOUNTER — NON-APPOINTMENT (OUTPATIENT)
Age: 71
End: 2021-10-05

## 2021-10-06 ENCOUNTER — RESULT REVIEW (OUTPATIENT)
Age: 71
End: 2021-10-06

## 2021-10-06 ENCOUNTER — APPOINTMENT (OUTPATIENT)
Dept: HEMATOLOGY ONCOLOGY | Facility: CLINIC | Age: 71
End: 2021-10-06

## 2021-10-06 ENCOUNTER — APPOINTMENT (OUTPATIENT)
Dept: RADIATION ONCOLOGY | Facility: CLINIC | Age: 71
End: 2021-10-06
Payer: MEDICARE

## 2021-10-06 VITALS
RESPIRATION RATE: 16 BRPM | OXYGEN SATURATION: 99 % | HEART RATE: 71 BPM | SYSTOLIC BLOOD PRESSURE: 116 MMHG | TEMPERATURE: 34.8 F | BODY MASS INDEX: 19.72 KG/M2 | DIASTOLIC BLOOD PRESSURE: 79 MMHG | WEIGHT: 137.46 LBS

## 2021-10-06 LAB
BASOPHILS # BLD AUTO: 0.02 K/UL — SIGNIFICANT CHANGE UP (ref 0–0.2)
BASOPHILS NFR BLD AUTO: 0.4 % — SIGNIFICANT CHANGE UP (ref 0–2)
EOSINOPHIL # BLD AUTO: 0.15 K/UL — SIGNIFICANT CHANGE UP (ref 0–0.5)
EOSINOPHIL NFR BLD AUTO: 2.7 % — SIGNIFICANT CHANGE UP (ref 0–6)
HCT VFR BLD CALC: 35.2 % — LOW (ref 39–50)
HGB BLD-MCNC: 11.1 G/DL — LOW (ref 13–17)
IMM GRANULOCYTES NFR BLD AUTO: 0.2 % — SIGNIFICANT CHANGE UP (ref 0–1.5)
LYMPHOCYTES # BLD AUTO: 0.71 K/UL — LOW (ref 1–3.3)
LYMPHOCYTES # BLD AUTO: 12.8 % — LOW (ref 13–44)
MCHC RBC-ENTMCNC: 31.5 G/DL — LOW (ref 32–36)
MCHC RBC-ENTMCNC: 31.6 PG — SIGNIFICANT CHANGE UP (ref 27–34)
MCV RBC AUTO: 100.3 FL — HIGH (ref 80–100)
MONOCYTES # BLD AUTO: 0.28 K/UL — SIGNIFICANT CHANGE UP (ref 0–0.9)
MONOCYTES NFR BLD AUTO: 5.1 % — SIGNIFICANT CHANGE UP (ref 2–14)
NEUTROPHILS # BLD AUTO: 4.37 K/UL — SIGNIFICANT CHANGE UP (ref 1.8–7.4)
NEUTROPHILS NFR BLD AUTO: 78.8 % — HIGH (ref 43–77)
NRBC # BLD: 0 /100 WBCS — SIGNIFICANT CHANGE UP (ref 0–0)
PLATELET # BLD AUTO: 168 K/UL — SIGNIFICANT CHANGE UP (ref 150–400)
RBC # BLD: 3.51 M/UL — LOW (ref 4.2–5.8)
RBC # FLD: 14.2 % — SIGNIFICANT CHANGE UP (ref 10.3–14.5)
WBC # BLD: 5.54 K/UL — SIGNIFICANT CHANGE UP (ref 3.8–10.5)
WBC # FLD AUTO: 5.54 K/UL — SIGNIFICANT CHANGE UP (ref 3.8–10.5)

## 2021-10-06 PROCEDURE — 99024 POSTOP FOLLOW-UP VISIT: CPT

## 2021-10-06 RX ORDER — CEPHALEXIN 250 MG/5ML
250 FOR SUSPENSION ORAL
Qty: 1 | Refills: 0 | Status: COMPLETED | COMMUNITY
Start: 2021-08-25 | End: 2021-10-06

## 2021-10-06 RX ORDER — OXYCODONE AND ACETAMINOPHEN 7.5; 325 MG/1; MG/1
7.5-325 TABLET ORAL
Qty: 75 | Refills: 0 | Status: DISCONTINUED | COMMUNITY
Start: 2021-08-11 | End: 2021-10-06

## 2021-10-06 RX ORDER — METOCLOPRAMIDE 10 MG/1
10 TABLET ORAL
Qty: 60 | Refills: 5 | Status: DISCONTINUED | COMMUNITY
Start: 2021-05-07 | End: 2021-10-06

## 2021-10-06 RX ORDER — ONDANSETRON 4 MG/1
4 TABLET ORAL
Qty: 60 | Refills: 3 | Status: DISCONTINUED | COMMUNITY
Start: 2021-07-09 | End: 2021-10-06

## 2021-10-06 RX ORDER — FLUCONAZOLE 40 MG/ML
40 POWDER, FOR SUSPENSION ORAL
Qty: 2 | Refills: 2 | Status: COMPLETED | COMMUNITY
Start: 2021-08-11 | End: 2021-10-06

## 2021-10-06 RX ORDER — NYSTATIN 100000 [USP'U]/ML
100000 SUSPENSION ORAL
Qty: 1 | Refills: 3 | Status: COMPLETED | COMMUNITY
Start: 2021-07-09 | End: 2021-10-06

## 2021-10-06 RX ORDER — METOCLOPRAMIDE HYDROCHLORIDE 5 MG/5ML
5 SOLUTION ORAL
Qty: 1 | Refills: 1 | Status: DISCONTINUED | COMMUNITY
Start: 2021-09-02 | End: 2021-10-06

## 2021-10-06 RX ORDER — GABAPENTIN 250 MG/5ML
250 SOLUTION ORAL
Qty: 300 | Refills: 0 | Status: DISCONTINUED | COMMUNITY
Start: 2021-06-03 | End: 2021-10-06

## 2021-10-07 ENCOUNTER — NON-APPOINTMENT (OUTPATIENT)
Age: 71
End: 2021-10-07

## 2021-10-07 NOTE — REVIEW OF SYSTEMS
[Dysphagia: Grade 2 - Symptomatic and altered eating/swallowing] : Dysphagia: Grade 2 - Symptomatic and altered eating/swallowing [Fatigue: Grade 1 - Fatigue relieved by rest] : Fatigue: Grade 1 - Fatigue relieved by rest [Mucositis Oral: Grade 0] : Mucositis Oral: Grade 0  [Xerostomia: Grade 1 - Symptomatic (e.g., dry or thick saliva) without significant dietary alteration; unstimulated saliva flow >0.2 ml/min] : Xerostomia: Grade 1 - Symptomatic (e.g., dry or thick saliva) without significant dietary alteration; unstimulated saliva flow >0.2 ml/min [Oral Pain: Grade 0] : Oral Pain: Grade 0 [Dysgeusia: Grade 2 - Altered taste with change in diet (e.g., oral supplements); noxious or unpleasant taste; loss of taste] : Dysgeusia: Grade 2 - Altered taste with change in diet (e.g., oral supplements); noxious or unpleasant taste; loss of taste [Skin Hyperpigmentation: Grade 1 - Hyperpigmentation covering <10% BSA; no psychosocial impact] : Skin Hyperpigmentation: Grade 1 - Hyperpigmentation covering <10% BSA; no psychosocial impact [Dermatitis Radiation: Grade 0] : Dermatitis Radiation: Grade 0 [FreeTextEntry6] : PEG

## 2021-10-07 NOTE — HISTORY OF PRESENT ILLNESS
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 Completed RT to oropharynx/neck total dose of 6600 cGy/7000 cGy on 8/13/2021.\par \par Presents today for post treatment evaluation. Endorses some intermittent xerostomia, and some mild dysphagia. Sense of \par taste gradually returning.

## 2021-10-08 LAB
ANION GAP SERPL CALC-SCNC: 12 MMOL/L
BUN SERPL-MCNC: 22 MG/DL
CALCIUM SERPL-MCNC: 9.4 MG/DL
CHLORIDE SERPL-SCNC: 102 MMOL/L
CO2 SERPL-SCNC: 24 MMOL/L
CREAT SERPL-MCNC: 1.18 MG/DL
GLUCOSE SERPL-MCNC: 79 MG/DL
POTASSIUM SERPL-SCNC: 4.4 MMOL/L
SODIUM SERPL-SCNC: 138 MMOL/L

## 2021-12-07 ENCOUNTER — RESULT REVIEW (OUTPATIENT)
Age: 71
End: 2021-12-07

## 2021-12-10 ENCOUNTER — APPOINTMENT (OUTPATIENT)
Dept: NUCLEAR MEDICINE | Facility: IMAGING CENTER | Age: 71
End: 2021-12-10
Payer: MEDICARE

## 2021-12-10 ENCOUNTER — OUTPATIENT (OUTPATIENT)
Dept: OUTPATIENT SERVICES | Facility: HOSPITAL | Age: 71
LOS: 1 days | End: 2021-12-10
Payer: MEDICARE

## 2021-12-10 DIAGNOSIS — C09.8 MALIGNANT NEOPLASM OF OVERLAPPING SITES OF TONSIL: ICD-10-CM

## 2021-12-10 DIAGNOSIS — C77.0 SECONDARY AND UNSPECIFIED MALIGNANT NEOPLASM OF LYMPH NODES OF HEAD, FACE AND NECK: ICD-10-CM

## 2021-12-10 PROCEDURE — 78815 PET IMAGE W/CT SKULL-THIGH: CPT

## 2021-12-10 PROCEDURE — 78815 PET IMAGE W/CT SKULL-THIGH: CPT | Mod: 26,PS

## 2021-12-10 PROCEDURE — A9552: CPT

## 2022-01-03 NOTE — PROGRESS NOTE ADULT - SUBJECTIVE AND OBJECTIVE BOX
Patient is a 71y old  Male who presents with a chief complaint of odynophagia (19 Aug 2021 12:41)    SUBJECTIVE / OVERNIGHT EVENTS:  Patient evaluated in the early afternoon. Pt mentioned soreness, not pain at PEG tube site. Pt tolerating tube feeds thus far without n/v. Pt unable to tolerate speech/swallow eval today due to severe pain.    MEDICATIONS  (STANDING):  dextrose 5% + sodium chloride 0.45% with potassium chloride 20 mEq/L 1000 milliLiter(s) (80 mL/Hr) IV Continuous <Continuous>  heparin   Injectable 5000 Unit(s) SubCutaneous every 8 hours    MEDICATIONS  (PRN):  aluminum hydroxide/magnesium hydroxide/simethicone Suspension 30 milliLiter(s) Oral every 4 hours PRN Dyspepsia  benzocaine 15 mG/menthol 3.6 mG (Sugar-Free) Lozenge 1 Lozenge Oral every 2 hours PRN Sore Throat  HYDROmorphone  Injectable 0.5 milliGRAM(s) IV Push every 4 hours PRN Severe Pain (7 - 10)  lidocaine 2% Viscous 15 milliLiter(s) Swish and Spit every 3 hours PRN Mouth Care  melatonin 3 milliGRAM(s) Oral at bedtime PRN Insomnia  ondansetron Injectable 4 milliGRAM(s) IV Push every 8 hours PRN Nausea and/or Vomiting      Vital Signs Last 24 Hrs  T(C): 37.6 (19 Aug 2021 12:49), Max: 37.6 (19 Aug 2021 12:49)  T(F): 99.6 (19 Aug 2021 12:49), Max: 99.6 (19 Aug 2021 12:49)  HR: 76 (19 Aug 2021 12:49) (76 - 94)  BP: 142/77 (19 Aug 2021 12:49) (115/74 - 142/77)  RR: 18 (19 Aug 2021 06:20) (18 - 18)  SpO2: 99% (19 Aug 2021 06:20) (99% - 99%)          I&O's Summary    18 Aug 2021 07:01  -  19 Aug 2021 07:00  --------------------------------------------------------  IN: 0 mL / OUT: 700 mL / NET: -700 mL          PHYSICAL EXAM  GENERAL: NAD, on RA, thin framed  NECK: skin hyperpigmentation, desquamation and scabs noted externally  CHEST/LUNG: Clear to auscultation bilaterally; No wheeze  HEART: Regular rate and rhythm;   ABDOMEN: Soft, Nontender, Nondistended; +PEG tube in place, dressing c/d/i - tube feeds infusing  EXTREMITIES:  ROM intact, No clubbing, cyanosis, or edema  PSYCH: AAOx3        LABS:                        8.1    6.06  )-----------( 215      ( 18 Aug 2021 06:21 )             24.6     08-19    133<L>  |  108<H>  |  12  ----------------------------<  102<H>  3.6   |  24  |  1.24    Ca    8.6      19 Aug 2021 07:31  Phos  2.3     08-19  Mg     1.60     08-19            Consultant(s) Notes Reviewed:  yes  Care Discussed with Consultants/Other Providers:   none

## 2022-04-07 NOTE — DISEASE MANAGEMENT
[Clinical] : TNM Stage: c [I] : I [TTNM] : 2 [NTNM] : 1 [MTNM] : 0 [de-identified] : 6574 [de-identified] : 71 Gy [de-identified] : oropharynx / neck no abdominal distension/no blood in stool/no diarrhea/no fever/no hematuria/no chills

## 2022-06-16 ENCOUNTER — OUTPATIENT (OUTPATIENT)
Dept: OUTPATIENT SERVICES | Facility: HOSPITAL | Age: 72
LOS: 1 days | Discharge: HOME | End: 2022-06-16

## 2022-06-16 DIAGNOSIS — C09.9 MALIGNANT NEOPLASM OF TONSIL, UNSPECIFIED: ICD-10-CM

## 2022-06-17 ENCOUNTER — NON-APPOINTMENT (OUTPATIENT)
Age: 72
End: 2022-06-17

## 2022-06-22 ENCOUNTER — RESULT REVIEW (OUTPATIENT)
Age: 72
End: 2022-06-22

## 2022-06-22 ENCOUNTER — APPOINTMENT (OUTPATIENT)
Dept: HEMATOLOGY ONCOLOGY | Facility: CLINIC | Age: 72
End: 2022-06-22

## 2022-06-22 ENCOUNTER — NON-APPOINTMENT (OUTPATIENT)
Age: 72
End: 2022-06-22

## 2022-06-22 VITALS
DIASTOLIC BLOOD PRESSURE: 78 MMHG | WEIGHT: 149.47 LBS | RESPIRATION RATE: 16 BRPM | TEMPERATURE: 97.2 F | SYSTOLIC BLOOD PRESSURE: 119 MMHG | BODY MASS INDEX: 21.4 KG/M2 | OXYGEN SATURATION: 99 % | HEIGHT: 70 IN | HEART RATE: 85 BPM

## 2022-06-22 DIAGNOSIS — R91.1 SOLITARY PULMONARY NODULE: ICD-10-CM

## 2022-06-22 LAB
BASOPHILS # BLD AUTO: 0.03 K/UL — SIGNIFICANT CHANGE UP (ref 0–0.2)
BASOPHILS NFR BLD AUTO: 0.4 % — SIGNIFICANT CHANGE UP (ref 0–2)
EOSINOPHIL # BLD AUTO: 0.17 K/UL — SIGNIFICANT CHANGE UP (ref 0–0.5)
EOSINOPHIL NFR BLD AUTO: 2 % — SIGNIFICANT CHANGE UP (ref 0–6)
HCT VFR BLD CALC: 41.9 % — SIGNIFICANT CHANGE UP (ref 39–50)
HGB BLD-MCNC: 13.2 G/DL — SIGNIFICANT CHANGE UP (ref 13–17)
IMM GRANULOCYTES NFR BLD AUTO: 0.8 % — SIGNIFICANT CHANGE UP (ref 0–1.5)
LYMPHOCYTES # BLD AUTO: 0.81 K/UL — LOW (ref 1–3.3)
LYMPHOCYTES # BLD AUTO: 9.6 % — LOW (ref 13–44)
MCHC RBC-ENTMCNC: 31 PG — SIGNIFICANT CHANGE UP (ref 27–34)
MCHC RBC-ENTMCNC: 31.5 G/DL — LOW (ref 32–36)
MCV RBC AUTO: 98.4 FL — SIGNIFICANT CHANGE UP (ref 80–100)
MONOCYTES # BLD AUTO: 0.58 K/UL — SIGNIFICANT CHANGE UP (ref 0–0.9)
MONOCYTES NFR BLD AUTO: 6.9 % — SIGNIFICANT CHANGE UP (ref 2–14)
NEUTROPHILS # BLD AUTO: 6.74 K/UL — SIGNIFICANT CHANGE UP (ref 1.8–7.4)
NEUTROPHILS NFR BLD AUTO: 80.3 % — HIGH (ref 43–77)
NRBC # BLD: 0 /100 WBCS — SIGNIFICANT CHANGE UP (ref 0–0)
PLATELET # BLD AUTO: 208 K/UL — SIGNIFICANT CHANGE UP (ref 150–400)
RBC # BLD: 4.26 M/UL — SIGNIFICANT CHANGE UP (ref 4.2–5.8)
RBC # FLD: 12.1 % — SIGNIFICANT CHANGE UP (ref 10.3–14.5)
WBC # BLD: 8.4 K/UL — SIGNIFICANT CHANGE UP (ref 3.8–10.5)
WBC # FLD AUTO: 8.4 K/UL — SIGNIFICANT CHANGE UP (ref 3.8–10.5)

## 2022-06-22 PROCEDURE — 99214 OFFICE O/P EST MOD 30 MIN: CPT

## 2022-06-23 LAB
ALBUMIN SERPL ELPH-MCNC: 4.5 G/DL
ALP BLD-CCNC: 78 U/L
ALT SERPL-CCNC: 33 U/L
ANION GAP SERPL CALC-SCNC: 15 MMOL/L
AST SERPL-CCNC: 28 U/L
BILIRUB SERPL-MCNC: 0.4 MG/DL
BUN SERPL-MCNC: 22 MG/DL
CALCIUM SERPL-MCNC: 9.9 MG/DL
CHLORIDE SERPL-SCNC: 102 MMOL/L
CO2 SERPL-SCNC: 26 MMOL/L
CREAT SERPL-MCNC: 1.28 MG/DL
EGFR: 60 ML/MIN/1.73M2
GLUCOSE SERPL-MCNC: 101 MG/DL
MAGNESIUM SERPL-MCNC: 2.3 MG/DL
POTASSIUM SERPL-SCNC: 4.1 MMOL/L
PROT SERPL-MCNC: 7.6 G/DL
SODIUM SERPL-SCNC: 143 MMOL/L

## 2022-06-26 NOTE — ED PROVIDER NOTE - ASSOCIATED SYMPTOMS
redness, swelling 77yo male with PMH of prostate cancer, SCC of tongue s/p partial resection now with recurrence, parkinson's disease, hypertension, hyperlipidemia, recent CVA, and oropharyngeal dysphagia admitted for septic shock and acute hypoxic respiratory failure in the setting of b/l lower lobe E. coli pneumonia and Pseudomonas UTI.    Neuro:  - Encephalopathy likely 2/2 to hyperammonemia noted to be 66  - Continue Lactulose and Rifaximin and titrate with BM   - Tylenol PRN  - Continue home Citalopran, and Sinemet for Parkinson's   - Continue with half dose of Primidone given mental status and monitor for improvement   - F/U Ammonia level tomorrow      CV:  - Currently hemodynamically stable   - Hypotension 2/2 septic shock: improving    Pulm:  - Remains on mechanical ventilation and wean as tolerated   - Duoneb q6h and hypersecretion therapy                   GI:  - NPO with Vital Tube feeds  - PEG tube placed 6/21/22  - IV Protonix 40mg qd for GI ppx    Renal:  - Cr stable  - S/P Amato   - Continue Free water 350mL q4h  - Monitor I&Os and renal indices     Heme:  - Heparin subq D/Yves today   - Started on full dose of Lovenox today given Hx as below   - Per prior Hospitalist note from 6/2, pt was on full dose AC for recent stroke and hypercoagulable state    ID:  - B/l lower lobe PNA and UTI  - Urine cx positive for Pseudomonas  - ET positive for E.Coli now with Pseudomonas    - Continue meropenem and Tobramycin     Endo:  - PTH wnl, vit D,25,hydroxy 39.2 and vit D,1,25 dihydroxy 89.9, PTHrP <2.2  - Hypercalcemia likely inflammatory  - Hypernatremia now resolved   - Fingersticks q6h, blood glucose goal 100-180 in ICU    Skin:  - Decubitus ulcer wound care following     Tubes/lines:  - Midline, PEG tube

## 2022-06-27 ENCOUNTER — OUTPATIENT (OUTPATIENT)
Dept: OUTPATIENT SERVICES | Facility: HOSPITAL | Age: 72
LOS: 1 days | End: 2022-06-27
Payer: COMMERCIAL

## 2022-06-27 ENCOUNTER — APPOINTMENT (OUTPATIENT)
Dept: CT IMAGING | Facility: IMAGING CENTER | Age: 72
End: 2022-06-27
Payer: MEDICARE

## 2022-06-27 DIAGNOSIS — C09.8 MALIGNANT NEOPLASM OF OVERLAPPING SITES OF TONSIL: ICD-10-CM

## 2022-06-27 PROCEDURE — 71250 CT THORAX DX C-: CPT | Mod: 26

## 2022-06-27 PROCEDURE — 71250 CT THORAX DX C-: CPT

## 2022-06-30 NOTE — PHYSICAL EXAM
[Restricted in physically strenuous activity but ambulatory and able to carry out work of a light or sedentary nature] : Status 1- Restricted in physically strenuous activity but ambulatory and able to carry out work of a light or sedentary nature, e.g., light house work, office work [Normal] : affect appropriate [de-identified] : No icterus [de-identified] : MMM O/P Clear [de-identified] : No LAD [de-identified] : Clear [de-identified] : S1 S2 [de-identified] : No edema [de-identified] : Soft NT + PEG  [de-identified] : No spine/CVA tenderness [de-identified] : Ambulatory

## 2022-06-30 NOTE — RESULTS/DATA
[FreeTextEntry1] : -CT neck 4/26/2021: Left oropharyngeal malignancy measuring 3.0 x 2.8 x 2.4 cm with ipsilateral left level 2 lymph nodes measuring up to 3.8 cm.  \par \par -PET/CT 5/27/21: 1. FDG avid mass in the left base of tongue involving the left tonsil, and glossotonsillar sulcus with FDG avid left level 2/3 lymph nodes, consistent with squamous cell carcinoma with regional yamel metastatic disease.\par 2. FDG avid focus in the left nasopharynx, separate from the left tongue base mass, indeterminate. Suggest correlation with direct inspection.\par 3. No evidence of FDG avid distant metastatic disease.\par \par Images Reviewed/Interpreted:\par \par -PET/CT 12/10/21:  Compared to FDG PET/CT dated 5/27/2021:\par 1. Resolution of FDG-avid mass centered in left base of tongue.\par 2. Resolution of hypermetabolism associated with enlarged, partially calcified left level II/III cervical lymph nodes which are decreased in size or have resolved and are difficult to delineate on CT.\par 3. Resolution of separate, mild FDG activity in left side of nasopharynx.\par 4. Non-FDG-avid thin-walled multiseptated cystic left lower lobe lung lesion (image 103), better seen than on prior study, possibly related to CT technique. Dedicated CT of chest is recommended for further characterization.\par \par

## 2022-06-30 NOTE — HISTORY OF PRESENT ILLNESS
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  He sought medical attention and was put on antibiotics at first.  When symptoms were not improving, he was referred to ENT in March who noted a tonsillar/base of tongue mass.  He was sent for a CT scan of his neck revealing a 3 cm mass spanning the left base of tongue and left oropharyngeal tonsils with ipsilateral left-sided level 2 lymph nodes measuring up to 3.8 cm.  Biopsy of the mass revealed squamous cell carcinoma, p16 positive. Started chemotherapy with weekly Cisplatin concurrent with RT on 6/25/21. Chemo was changed to Carbo/Taxol due to ZORAIDA with C6; he completed C7  in early August 2021. RT was completed on 8/12/21. PEG was placed on 8/18/21. \par  [de-identified] : -Left tonsillar mass 4/28/2021: Squamous cell carcinoma.  IHC for p16 is positive.   [de-identified] : Patient completed 7 cycles of chemotherapy in Aug 2021; Cisplatin was changed to Carbo/Paclitaxel with C6 due to ZORAIDA.  RT completed in mid-August.  He required PEG feeding tube placement secondary to radiation mucositis.  He continued on supportive IVF hydration the end of September.  He had f/u with Rad-Onc in early August.  Restaging PET/CT was ordered for December, which he went for.  He has not followed up appropriately since October.  \par \par He reports not knowing that he needed to have follow-up appointments.\par He reports dysgeusia.  He has been maintaining his nutritional status mainly via the PEG feeds; he reports that he is not eating by choice due to a lack of taste but claims he is able to swallow.\par He reports having follow-up with his ENT physician Dr. Sands in May and says that "everything was okay" however there is no documentation to that effect available.\par He reports desiring to have the PEG tube removed.\par \par Restaging PET/CT from December 2021 revealed a nice apparent CR to definitive chemo/RT, however there is note of a thin-walled multiseptated cystic left lower lobe lung lesion for which CT chest was recommended for further characterization.

## 2022-06-30 NOTE — ASSESSMENT
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  Treated with definitive concurrent chemo/RT from June-August 2021; received 5 weekly Cisplatin cycles followed by change in systemic therapy to Carboplatin/Paclitaxel for final 2 cycles secondary to ZORAIDA.  Patient did not follow-up appropriately.  Restaging PET/CT Dec 2021 with apparent CR with regards to H&N cancer, with unclear LLL lung finding.  \par Recommend:\par -Repeat labs today\par -F/U with Rad-Onc\par -Rad-Onc to decide appropriateness of PEG removal; patient may need f/u with Dr. Peñaloza.  \par -ENT f/u with Dr. Sands for directed exams\par -Obtain CT Chest to further evaluate the PET/CT findings.  I can call him with results.  F/U here in 3 months otherwise, or sooner should problems arise \par \par

## 2022-07-07 ENCOUNTER — NON-APPOINTMENT (OUTPATIENT)
Age: 72
End: 2022-07-07

## 2022-07-13 ENCOUNTER — RESULT REVIEW (OUTPATIENT)
Age: 72
End: 2022-07-13

## 2022-07-13 ENCOUNTER — APPOINTMENT (OUTPATIENT)
Dept: RADIATION ONCOLOGY | Facility: CLINIC | Age: 72
End: 2022-07-13

## 2022-07-13 VITALS
HEIGHT: 70 IN | RESPIRATION RATE: 18 BRPM | TEMPERATURE: 97.16 F | HEART RATE: 78 BPM | BODY MASS INDEX: 21.7 KG/M2 | DIASTOLIC BLOOD PRESSURE: 79 MMHG | SYSTOLIC BLOOD PRESSURE: 131 MMHG | WEIGHT: 151.57 LBS | OXYGEN SATURATION: 98 %

## 2022-07-13 PROCEDURE — 99213 OFFICE O/P EST LOW 20 MIN: CPT

## 2022-07-13 RX ORDER — DIPHENHYDRAMINE HYDROCHLORIDE AND LIDOCAINE HYDROCHLORIDE AND ALUMINUM HYDROXIDE AND MAGNESIUM HYDRO
KIT
Qty: 300 | Refills: 3 | Status: DISCONTINUED | COMMUNITY
Start: 2021-08-04 | End: 2022-07-13

## 2022-07-13 RX ORDER — DIPHENHYDRAMINE HYDROCHLORIDE AND LIDOCAINE HYDROCHLORIDE AND ALUMINUM HYDROXIDE AND MAGNESIUM HYDRO
KIT
Qty: 300 | Refills: 3 | Status: DISCONTINUED | COMMUNITY
Start: 2021-08-11 | End: 2022-07-13

## 2022-07-13 NOTE — REVIEW OF SYSTEMS
[Fatigue: Grade 0] : Fatigue: Grade 0 [Mucositis Oral: Grade 0] : Mucositis Oral: Grade 0  [Xerostomia: Grade 1 - Symptomatic (e.g., dry or thick saliva) without significant dietary alteration; unstimulated saliva flow >0.2 ml/min] : Xerostomia: Grade 1 - Symptomatic (e.g., dry or thick saliva) without significant dietary alteration; unstimulated saliva flow >0.2 ml/min [Oral Pain: Grade 0] : Oral Pain: Grade 0 [Dysgeusia: Grade 1- Altered taste but no change in diet] : Dysgeusia: Grade 1 - Altered taste but no change in diet [Hoarseness: Grade 0] : Hoarseness: Grade 0 [Skin Hyperpigmentation: Grade 0] : Skin Hyperpigmentation: Grade 0 [Dermatitis Radiation: Grade 0] : Dermatitis Radiation: Grade 0

## 2022-07-14 NOTE — HISTORY OF PRESENT ILLNESS
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 Completed RT to oropharynx/neck total dose of 6600 cGy/7000 cGy on 8/13/2021.\par \par 6/27/2022 CT Chest:  IMPRESSION: New cluster of peridiaphragmatic left lower lobe nodules since December 2021; while findings may be related to infectious/inflammatory small airways disease, a follow-up chest CT in 3 months is recommended.\par Stable multilocular left lower lobe cystic lesion without irregular wall thickening/eccentric nodule.\par \par Presents today for follow up. Endorses some intermittent xerostomia, and some mild dysphagia. Sense of \par taste gradually returning.

## 2022-07-18 ENCOUNTER — APPOINTMENT (OUTPATIENT)
Dept: CT IMAGING | Facility: IMAGING CENTER | Age: 72
End: 2022-07-18

## 2022-07-18 ENCOUNTER — OUTPATIENT (OUTPATIENT)
Dept: OUTPATIENT SERVICES | Facility: HOSPITAL | Age: 72
LOS: 1 days | End: 2022-07-18
Payer: MEDICARE

## 2022-07-18 DIAGNOSIS — C09.8 MALIGNANT NEOPLASM OF OVERLAPPING SITES OF TONSIL: ICD-10-CM

## 2022-07-18 PROCEDURE — 70491 CT SOFT TISSUE NECK W/DYE: CPT | Mod: 26

## 2022-07-18 PROCEDURE — 70491 CT SOFT TISSUE NECK W/DYE: CPT

## 2022-07-25 ENCOUNTER — NON-APPOINTMENT (OUTPATIENT)
Age: 72
End: 2022-07-25

## 2022-07-29 LAB — SARS-COV-2 N GENE NPH QL NAA+PROBE: NOT DETECTED

## 2022-08-02 ENCOUNTER — APPOINTMENT (OUTPATIENT)
Dept: THORACIC SURGERY | Facility: HOSPITAL | Age: 72
End: 2022-08-02

## 2022-08-02 ENCOUNTER — OUTPATIENT (OUTPATIENT)
Dept: OUTPATIENT SERVICES | Facility: HOSPITAL | Age: 72
LOS: 1 days | Discharge: HOME | End: 2022-08-02

## 2022-08-02 VITALS
SYSTOLIC BLOOD PRESSURE: 114 MMHG | DIASTOLIC BLOOD PRESSURE: 85 MMHG | RESPIRATION RATE: 15 BRPM | HEART RATE: 63 BPM | OXYGEN SATURATION: 99 %

## 2022-08-02 VITALS
OXYGEN SATURATION: 98 % | RESPIRATION RATE: 18 BRPM | TEMPERATURE: 98 F | DIASTOLIC BLOOD PRESSURE: 89 MMHG | WEIGHT: 151.9 LBS | SYSTOLIC BLOOD PRESSURE: 118 MMHG | HEART RATE: 71 BPM

## 2022-08-02 DIAGNOSIS — C76.0 MALIGNANT NEOPLASM OF HEAD, FACE AND NECK: ICD-10-CM

## 2022-08-02 PROCEDURE — 43247 EGD REMOVE FOREIGN BODY: CPT | Mod: 52

## 2022-08-02 RX ORDER — OXYCODONE HYDROCHLORIDE 5 MG/1
1 TABLET ORAL
Qty: 16 | Refills: 0
Start: 2022-08-02 | End: 2022-08-05

## 2022-08-02 NOTE — BRIEF OPERATIVE NOTE - NSICDXBRIEFPROCEDURE_GEN_ALL_CORE_FT
PROCEDURES:  EGD, with PEG tube removal 02-Aug-2022 09:19:57 with electrocautry of granulation tissue Jarrod Carmona

## 2022-08-16 ENCOUNTER — APPOINTMENT (OUTPATIENT)
Dept: THORACIC SURGERY | Facility: CLINIC | Age: 72
End: 2022-08-16

## 2022-08-16 VITALS
DIASTOLIC BLOOD PRESSURE: 70 MMHG | HEART RATE: 74 BPM | HEIGHT: 72 IN | BODY MASS INDEX: 20.59 KG/M2 | RESPIRATION RATE: 17 BRPM | SYSTOLIC BLOOD PRESSURE: 115 MMHG | OXYGEN SATURATION: 98 % | WEIGHT: 152 LBS

## 2022-08-16 DIAGNOSIS — Z43.1 ENCOUNTER FOR ATTENTION TO GASTROSTOMY: ICD-10-CM

## 2022-08-16 PROCEDURE — 99214 OFFICE O/P EST MOD 30 MIN: CPT

## 2022-08-18 PROBLEM — Z43.1 ENCOUNTER FOR PEG (PERCUTANEOUS ENDOSCOPIC GASTROSTOMY): Status: ACTIVE | Noted: 2022-08-18

## 2022-08-18 NOTE — HISTORY OF PRESENT ILLNESS
[FreeTextEntry1] : The patient is a 72 year-old male with history of head and neck cancer who is status post percutaneous endoscopic gastrostomy.  The patient is tolerating his p.o. intake well and was advised to have the feeding tube removed. \par \par Now, s/p upper endoscopy, removal of PEG tube and electrofulguration of granulation tissue on 8/2/22.\par \par \par Patient presents today for follow up. PO intake good; weight stable. No issues with Peg site. No fevers.

## 2022-08-18 NOTE — PHYSICAL EXAM
[] : no respiratory distress [Respiration, Rhythm And Depth] : normal respiratory rhythm and effort [Exaggerated Use Of Accessory Muscles For Inspiration] : no accessory muscle use [2+] : left 2+ [Bowel Sounds] : normal bowel sounds [Abdomen Soft] : soft [Abdomen Tenderness] : non-tender [Abnormal Walk] : normal gait [Oriented To Time, Place, And Person] : oriented to person, place, and time [FreeTextEntry1] : Well approximated, healing wound to former PEG site.

## 2022-10-06 ENCOUNTER — OUTPATIENT (OUTPATIENT)
Dept: OUTPATIENT SERVICES | Facility: HOSPITAL | Age: 72
LOS: 1 days | End: 2022-10-06
Payer: MEDICARE

## 2022-10-06 ENCOUNTER — APPOINTMENT (OUTPATIENT)
Dept: CT IMAGING | Facility: IMAGING CENTER | Age: 72
End: 2022-10-06

## 2022-10-06 DIAGNOSIS — C09.8 MALIGNANT NEOPLASM OF OVERLAPPING SITES OF TONSIL: ICD-10-CM

## 2022-10-06 DIAGNOSIS — R91.8 OTHER NONSPECIFIC ABNORMAL FINDING OF LUNG FIELD: ICD-10-CM

## 2022-10-06 PROCEDURE — 71250 CT THORAX DX C-: CPT

## 2022-10-06 PROCEDURE — 71250 CT THORAX DX C-: CPT | Mod: 26

## 2022-10-20 ENCOUNTER — OUTPATIENT (OUTPATIENT)
Dept: OUTPATIENT SERVICES | Facility: HOSPITAL | Age: 72
LOS: 1 days | Discharge: HOME | End: 2022-10-20

## 2022-10-20 DIAGNOSIS — C09.9 MALIGNANT NEOPLASM OF TONSIL, UNSPECIFIED: ICD-10-CM

## 2022-10-26 ENCOUNTER — NON-APPOINTMENT (OUTPATIENT)
Age: 72
End: 2022-10-26

## 2022-11-01 ENCOUNTER — APPOINTMENT (OUTPATIENT)
Dept: HEMATOLOGY ONCOLOGY | Facility: CLINIC | Age: 72
End: 2022-11-01

## 2022-11-01 VITALS
BODY MASS INDEX: 21.5 KG/M2 | OXYGEN SATURATION: 97 % | HEIGHT: 71.97 IN | DIASTOLIC BLOOD PRESSURE: 75 MMHG | HEART RATE: 83 BPM | SYSTOLIC BLOOD PRESSURE: 108 MMHG | WEIGHT: 158.73 LBS | RESPIRATION RATE: 16 BRPM | TEMPERATURE: 98.2 F

## 2022-11-01 DIAGNOSIS — Z51.11 ENCOUNTER FOR ANTINEOPLASTIC CHEMOTHERAPY: ICD-10-CM

## 2022-11-01 PROCEDURE — 99214 OFFICE O/P EST MOD 30 MIN: CPT

## 2022-11-01 NOTE — RESULTS/DATA
[FreeTextEntry1] : -CT neck 4/26/2021: Left oropharyngeal malignancy measuring 3.0 x 2.8 x 2.4 cm with ipsilateral left level 2 lymph nodes measuring up to 3.8 cm.  \par \par -PET/CT 5/27/21: 1. FDG avid mass in the left base of tongue involving the left tonsil, and glossotonsillar sulcus with FDG avid left level 2/3 lymph nodes, consistent with squamous cell carcinoma with regional yamel metastatic disease.\par 2. FDG avid focus in the left nasopharynx, separate from the left tongue base mass, indeterminate. Suggest correlation with direct inspection.\par 3. No evidence of FDG avid distant metastatic disease.\par \par -PET/CT 12/10/21:  Compared to FDG PET/CT dated 5/27/2021:\par 1. Resolution of FDG-avid mass centered in left base of tongue.\par 2. Resolution of hypermetabolism associated with enlarged, partially calcified left level II/III cervical lymph nodes which are decreased in size or have resolved and are difficult to delineate on CT.\par 3. Resolution of separate, mild FDG activity in left side of nasopharynx.\par 4. Non-FDG-avid thin-walled multiseptated cystic left lower lobe lung lesion (image 103), better seen than on prior study, possibly related to CT technique. Dedicated CT of chest is recommended for further characterization.\par \par Images Reviewed/Interpreted:\par \par –CT Chest 6/27/22:  New cluster of peridiaphragmatic left lower lobe nodules since December 2021; while findings may be related to infectious/inflammatory small airways disease, a follow-up chest CT in 3 months is recommended.  Stable multilocular left lower lobe cystic lesion without irregular wall thickening/eccentric nodule.\par \par -CT Neck 7/18/22:  No evidence of recurrent or metastatic disease.\par \par -CT Chest 10/6/22:  Interval resolution of left lower lobe clustered small nodules compared to 6/27/2022.  New patchy multifocal left basilar ground-glass opacities, likely secondary to aspiration pneumonitis or infection. Recommend repeat CT chest in 3 months to ensure clearing.\par \par

## 2022-11-01 NOTE — ASSESSMENT
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  Treated with definitive concurrent chemo/RT from June-August 2021; received 5 weekly Cisplatin cycles followed by change in systemic therapy to Carboplatin/Paclitaxel for final 2 cycles secondary to ZORAIDA.  Achieved CR.  \par CT Neck from July 2022 was negative. \par CT Chest Oct 2022 reveals non-specific left base opacities.  \par Recommend:\par -F/U with his ENT physician for directed exams; he reports he is past due\par -F/U with Rad-Onc\par -Obtain CT Chest in January to monitor the lung nodules and f/u to review results\par – Check out form given to patient with instructions for making appointments

## 2022-11-01 NOTE — HISTORY OF PRESENT ILLNESS
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  He sought medical attention and was put on antibiotics at first.  When symptoms were not improving, he was referred to ENT in March who noted a tonsillar/base of tongue mass.  He was sent for a CT scan of his neck revealing a 3 cm mass spanning the left base of tongue and left oropharyngeal tonsils with ipsilateral left-sided level 2 lymph nodes measuring up to 3.8 cm.  Biopsy of the mass revealed squamous cell carcinoma, p16 positive. Started chemotherapy with weekly Cisplatin concurrent with RT on 6/25/21. Chemo was changed to Carbo/Taxol due to ZORAIDA with C6; he completed C7  in early August 2021. RT was completed on 8/12/21. PEG was placed on 8/18/21.  Achieved CR.  \par  [de-identified] : -Left tonsillar mass 4/28/2021: Squamous cell carcinoma.  IHC for p16 is positive.   [de-identified] : Patient completed definitive chemo/RT in Aug 2021; achieved CR.  \par He was last seen for medical oncology follow-up in June 2022.\par He was sent for CT chest performed in late June 2022 that revealed new LLL nodules.\par His case was reviewed at thoracic tumor board and it was noted there is a LLL cyst without any concerning nodularity.  The new LLL nodules were felt to be distal impacted airways and did not appear malignant.\par He underwent a surveillance CT neck in July 2022 that was negative.\par His PEG feeding tube was removed in August 2022 by thoracic surgery.\par \par He reports dysgeusia that is gradually improving.  He reports that he is able to eat a normal diet but usually gets most of his calories from softer foods including smoothies.  He gained some weight.\par He reports that he continues to follow-up with his ENT physician Dr. Sands and is due for an exam.\par \par CT chest from early October revealed resolution of the prior LLL nodules however there are new patchy opacities in the left base felt to be inflammatory.

## 2022-11-01 NOTE — PHYSICAL EXAM
[Restricted in physically strenuous activity but ambulatory and able to carry out work of a light or sedentary nature] : Status 1- Restricted in physically strenuous activity but ambulatory and able to carry out work of a light or sedentary nature, e.g., light house work, office work [Normal] : affect appropriate [de-identified] : No icterus [de-identified] : MMM O/P Clear [de-identified] : No LAD [de-identified] : Clear [de-identified] : S1 S2 [de-identified] : No edema [de-identified] : No spine/CVA tenderness [de-identified] : Ambulatory

## 2023-01-06 ENCOUNTER — OUTPATIENT (OUTPATIENT)
Dept: OUTPATIENT SERVICES | Facility: HOSPITAL | Age: 73
LOS: 1 days | Discharge: HOME | End: 2023-01-06

## 2023-01-06 DIAGNOSIS — C09.9 MALIGNANT NEOPLASM OF TONSIL, UNSPECIFIED: ICD-10-CM

## 2023-01-07 ENCOUNTER — OUTPATIENT (OUTPATIENT)
Dept: OUTPATIENT SERVICES | Facility: HOSPITAL | Age: 73
LOS: 1 days | End: 2023-01-07
Payer: MEDICARE

## 2023-01-07 ENCOUNTER — APPOINTMENT (OUTPATIENT)
Dept: CT IMAGING | Facility: IMAGING CENTER | Age: 73
End: 2023-01-07
Payer: MEDICARE

## 2023-01-07 DIAGNOSIS — C09.8 MALIGNANT NEOPLASM OF OVERLAPPING SITES OF TONSIL: ICD-10-CM

## 2023-01-07 PROCEDURE — 71250 CT THORAX DX C-: CPT | Mod: 26

## 2023-01-07 PROCEDURE — 71250 CT THORAX DX C-: CPT

## 2023-01-10 ENCOUNTER — APPOINTMENT (OUTPATIENT)
Dept: HEMATOLOGY ONCOLOGY | Facility: CLINIC | Age: 73
End: 2023-01-10
Payer: MEDICARE

## 2023-01-10 VITALS
HEART RATE: 64 BPM | RESPIRATION RATE: 15 BRPM | BODY MASS INDEX: 21.55 KG/M2 | TEMPERATURE: 97.1 F | DIASTOLIC BLOOD PRESSURE: 87 MMHG | SYSTOLIC BLOOD PRESSURE: 138 MMHG | WEIGHT: 158.73 LBS | OXYGEN SATURATION: 96 %

## 2023-01-10 DIAGNOSIS — R91.8 OTHER NONSPECIFIC ABNORMAL FINDING OF LUNG FIELD: ICD-10-CM

## 2023-01-10 DIAGNOSIS — C77.0 SECONDARY AND UNSPECIFIED MALIGNANT NEOPLASM OF LYMPH NODES OF HEAD, FACE AND NECK: ICD-10-CM

## 2023-01-10 DIAGNOSIS — C09.8: ICD-10-CM

## 2023-01-10 PROCEDURE — 99213 OFFICE O/P EST LOW 20 MIN: CPT

## 2023-01-12 PROBLEM — C77.0 SECONDARY MALIGNANT NEOPLASM LYMPH NODES OF HEAD, FACE AND NECK: Status: ACTIVE | Noted: 2021-05-07

## 2023-01-12 PROBLEM — C09.8: Status: ACTIVE | Noted: 2021-05-07

## 2023-01-12 PROBLEM — R91.8 PULMONARY NODULES: Status: ACTIVE | Noted: 2022-07-07

## 2023-01-12 NOTE — ASSESSMENT
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  Treated with definitive concurrent chemo/RT from June-August 2021; received 5 weekly Cisplatin cycles followed by change in systemic therapy to Carboplatin/Paclitaxel for final 2 cycles secondary to ZORAIDA.  Achieved CR.  \par CT Neck from July 2022 was negative. \par CT Chest Jan 2023 is negative for metastatic disease.  \par Recommend:\par -Continue F/U with his ENT physician for directed exams; due in June 2023\par -Recommended F/U with Rad-Onc in 3 months around April; any surveillance scans can be ordered at that time, if needed\par -Can f/u her to review future scan results

## 2023-01-12 NOTE — RESULTS/DATA
[FreeTextEntry1] : -CT neck 4/26/2021: Left oropharyngeal malignancy measuring 3.0 x 2.8 x 2.4 cm with ipsilateral left level 2 lymph nodes measuring up to 3.8 cm.  \par \par -PET/CT 5/27/21: 1. FDG avid mass in the left base of tongue involving the left tonsil, and glossotonsillar sulcus with FDG avid left level 2/3 lymph nodes, consistent with squamous cell carcinoma with regional yamel metastatic disease.\par 2. FDG avid focus in the left nasopharynx, separate from the left tongue base mass, indeterminate. Suggest correlation with direct inspection.\par 3. No evidence of FDG avid distant metastatic disease.\par \par -PET/CT 12/10/21:  Compared to FDG PET/CT dated 5/27/2021:\par 1. Resolution of FDG-avid mass centered in left base of tongue.\par 2. Resolution of hypermetabolism associated with enlarged, partially calcified left level II/III cervical lymph nodes which are decreased in size or have resolved and are difficult to delineate on CT.\par 3. Resolution of separate, mild FDG activity in left side of nasopharynx.\par 4. Non-FDG-avid thin-walled multiseptated cystic left lower lobe lung lesion (image 103), better seen than on prior study, possibly related to CT technique. Dedicated CT of chest is recommended for further characterization.\par \par –CT Chest 6/27/22:  New cluster of peridiaphragmatic left lower lobe nodules since December 2021; while findings may be related to infectious/inflammatory small airways disease, a follow-up chest CT in 3 months is recommended.  Stable multilocular left lower lobe cystic lesion without irregular wall thickening/eccentric nodule.\par \par -CT Neck 7/18/22:  No evidence of recurrent or metastatic disease.\par \par -CT Chest 10/6/22:  Interval resolution of left lower lobe clustered small nodules compared to 6/27/2022.  New patchy multifocal left basilar ground-glass opacities, likely secondary to aspiration pneumonitis or infection. Recommend repeat CT chest in 3 months to ensure clearing.\par \par Images Reviewed/Interpreted:\par \par –CT Chest 1/7/23:  Interval near resolution of left lower lobe opacities. Mild residual peripheral groundglass opacity.  Stable 2.5 x 1.5 cm septated lobulated cyst within left lower lobe.\par \par

## 2023-01-12 NOTE — HISTORY OF PRESENT ILLNESS
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  He sought medical attention and was put on antibiotics at first.  When symptoms were not improving, he was referred to ENT in March who noted a tonsillar/base of tongue mass.  He was sent for a CT scan of his neck revealing a 3 cm mass spanning the left base of tongue and left oropharyngeal tonsils with ipsilateral left-sided level 2 lymph nodes measuring up to 3.8 cm.  Biopsy of the mass revealed squamous cell carcinoma, p16 positive. Started chemotherapy with weekly Cisplatin concurrent with RT on 6/25/21. Chemo was changed to Carbo/Taxol due to ZORAIDA with C6; he completed C7  in early August 2021. RT was completed on 8/12/21. PEG was placed on 8/18/21.  Achieved CR.  His PEG feeding tube was removed in August 2022 by thoracic surgery.\par  [de-identified] : -Left tonsillar mass 4/28/2021: Squamous cell carcinoma.  IHC for p16 is positive.   [de-identified] : Patient completed definitive chemo/RT in Aug 2021; achieved CR.  \par He reports feeling fairly well.  Continues working.  He has dysgeusia.  He reports being capable of eating and swallowing normally however he prefers a mostly liquid diet.  Has been maintaining his caloric intake mostly with shakes.  Weight is stable.\par He had follow-up with his ENT physician Dr. Sands and reports having an unremarkable exam in December 2022; he reports that he is due to return in 6 months.\par \par Surveillance CT chest performed this month to monitor the LLL nodules/opacities was not officially read at the time of this visit however findings appeared improved from prior.\par *Called and spoke with patient on 1/12/2023 and informed him the CT chest revealed interval near resolution of the prior LLL opacities with no new concerning findings.  Recommended he f/u with Rad-Onc in April (3 months from now) and he was agreeable.

## 2023-01-12 NOTE — PHYSICAL EXAM
[Normal] : affect appropriate [Fully active, able to carry on all pre-disease performance without restriction] : Status 0 - Fully active, able to carry on all pre-disease performance without restriction [de-identified] : No icterus [de-identified] : MMM O/P Clear [de-identified] : No LAD [de-identified] : Clear [de-identified] : S1 S2 [de-identified] : No edema [de-identified] : No spine/CVA tenderness [de-identified] : Ambulatory

## 2023-02-28 NOTE — ED ADULT NURSE NOTE - NSSUHOSCREENINGYN_ED_ALL_ED
Initiated authorization for right upper trapezius trigger point injection  [id number],  with [employer]  Status: Approved-authorization is not required per health plan          Patient had injection in office Yes - the patient is able to be screened

## 2023-06-21 NOTE — ED ADULT NURSE NOTE - SUICIDE SCREENING QUESTION 1
Cellulitis of axilla, right    -     cefTRIAXone injection 1 g - given in clinic @ 2:15 pm    -     doxycycline (VIBRAMYCIN) 100 MG Cap; Take 1 capsule (100 mg total) by mouth 2 (two) times daily. for 7 days  Dispense: 14 capsule; Refill: 0      - Wash daily with soap and water.      - Avoid picking or manipulating the wound.     - Warm compresses 3-5 times a day for 5-10 minutes at a time     - Drink plenty of fluids.      - Tylenol or Ibuprofen as directed as needed for fever/pain.    Take ibuprofen 600-800 mg every 6-8 hours for pain and inflammation.  Do not take ibuprofen if you have a history of GI bleeding, kidney disease, or if you take blood thinners.    You can also take Tylenol/acetaminophen 650-1000 mg every 6-8 hours for added pain relief. Avoid tylenol if you have a history of liver disease.       - If this is a recurrent issue, use hibiclens three times a week as body wash to help prevent future abscess(es), let stay on skin for 5 minutes before rinsing.off.       Return to urgent care for wound check in 3-5 days if not improving    You should seek ER treatment if fever, increase pain, swelling, red streaks from affected area or other signs of increasing infection.        
No

## 2023-07-08 ENCOUNTER — NON-APPOINTMENT (OUTPATIENT)
Age: 73
End: 2023-07-08

## 2023-09-23 ENCOUNTER — NON-APPOINTMENT (OUTPATIENT)
Age: 73
End: 2023-09-23

## 2024-08-21 ENCOUNTER — APPOINTMENT (OUTPATIENT)
Dept: CT IMAGING | Facility: IMAGING CENTER | Age: 74
End: 2024-08-21
Payer: MEDICARE

## 2024-08-21 ENCOUNTER — OUTPATIENT (OUTPATIENT)
Dept: OUTPATIENT SERVICES | Facility: HOSPITAL | Age: 74
LOS: 1 days | End: 2024-08-21
Payer: MEDICARE

## 2024-08-21 DIAGNOSIS — C09.8 MALIGNANT NEOPLASM OF OVERLAPPING SITES OF TONSIL: ICD-10-CM

## 2024-08-21 PROCEDURE — 71260 CT THORAX DX C+: CPT

## 2024-08-21 PROCEDURE — 70491 CT SOFT TISSUE NECK W/DYE: CPT | Mod: 26

## 2024-08-21 PROCEDURE — 70491 CT SOFT TISSUE NECK W/DYE: CPT

## 2024-08-21 PROCEDURE — 71260 CT THORAX DX C+: CPT | Mod: 26

## 2024-08-29 ENCOUNTER — APPOINTMENT (OUTPATIENT)
Dept: RADIATION ONCOLOGY | Facility: CLINIC | Age: 74
End: 2024-08-29
Payer: MEDICARE

## 2024-08-29 VITALS
BODY MASS INDEX: 20.03 KG/M2 | TEMPERATURE: 98.3 F | SYSTOLIC BLOOD PRESSURE: 124 MMHG | HEIGHT: 71 IN | RESPIRATION RATE: 15 BRPM | HEART RATE: 64 BPM | DIASTOLIC BLOOD PRESSURE: 78 MMHG | OXYGEN SATURATION: 99 % | WEIGHT: 143.08 LBS

## 2024-08-29 PROCEDURE — 99213 OFFICE O/P EST LOW 20 MIN: CPT | Mod: GC

## 2024-09-04 NOTE — HISTORY OF PRESENT ILLNESS
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 Completed RT to oropharynx/neck total dose of 6600 cGy/7000 cGy on 8/13/2021.  8/21/2024 CT Neck Soft Tissue: IMPRESSION: Stable interval follow-up CT examination without evidence of neoplastic disease progression nor recurrence. No new or enlarging cervical lymph nodes.  8/21/2024 CT Chest: Stable interval follow-up CT examination without evidence of neoplastic disease progression nor recurrence. No new or enlarging cervical lymph nodes.  Presents today for follow up. Denies pain. Patient is physically very active, biking up to 100 miles a week. Overall doing well.

## 2024-09-04 NOTE — PHYSICAL EXAM
[General Appearance - Well Developed] : well developed [General Appearance - Well Nourished] : well nourished [Sclera] : the sclera and conjunctiva were normal [Extraocular Movements] : extraocular movements were intact [Outer Ear] : the ears and nose were normal in appearance [Hearing Threshold Finger Rub Not [place]] : hearing was normal [Examination Of The Oral Cavity] : the lips and gums were normal [] : no respiratory distress [Exaggerated Use Of Accessory Muscles For Inspiration] : no accessory muscle use [Range of Motion to Joints] : range of motion to joints [Motor Tone] : muscle strength and tone were normal [Skin Color & Pigmentation] : normal skin color and pigmentation [No Focal Deficits] : no focal deficits [Oriented To Time, Place, And Person] : oriented to person, place, and time [de-identified] : post treatment changes, fibrosis

## 2024-09-04 NOTE — PHYSICAL EXAM
[General Appearance - Well Developed] : well developed [General Appearance - Well Nourished] : well nourished [Sclera] : the sclera and conjunctiva were normal [Extraocular Movements] : extraocular movements were intact [Outer Ear] : the ears and nose were normal in appearance [Hearing Threshold Finger Rub Not [place]] : hearing was normal [Examination Of The Oral Cavity] : the lips and gums were normal [] : no respiratory distress [Range of Motion to Joints] : range of motion to joints [Exaggerated Use Of Accessory Muscles For Inspiration] : no accessory muscle use [Motor Tone] : muscle strength and tone were normal [Skin Color & Pigmentation] : normal skin color and pigmentation [No Focal Deficits] : no focal deficits [Oriented To Time, Place, And Person] : oriented to person, place, and time [de-identified] : post treatment changes, fibrosis

## 2024-09-04 NOTE — PHYSICAL EXAM
[General Appearance - Well Developed] : well developed [General Appearance - Well Nourished] : well nourished [Sclera] : the sclera and conjunctiva were normal [Extraocular Movements] : extraocular movements were intact [Outer Ear] : the ears and nose were normal in appearance [Hearing Threshold Finger Rub Not [place]] : hearing was normal [Examination Of The Oral Cavity] : the lips and gums were normal [] : no respiratory distress [Exaggerated Use Of Accessory Muscles For Inspiration] : no accessory muscle use [Range of Motion to Joints] : range of motion to joints [Motor Tone] : muscle strength and tone were normal [Skin Color & Pigmentation] : normal skin color and pigmentation [No Focal Deficits] : no focal deficits [Oriented To Time, Place, And Person] : oriented to person, place, and time [de-identified] : post treatment changes, fibrosis

## 2024-12-01 NOTE — ASU PREOP CHECKLIST - DNR CLARIFICATION FORM COMPLETED
"Physical Therapy    Physical Therapy Treatment    Patient Name: Zena Baxter  MRN: [id number]  Today's Date: 12/2/2024    Time Entry:   Time Calculation  Start Time: 1513  Stop Time: 1558  Time Calculation (min): 45 min     PT Therapeutic Procedures Time Entry  Therapeutic Exercise Time Entry: 45                 Visit #11 (3 of 10 new)  2024  90 DAY 2/11/2025     Assessment:   Pt tolerated session well without adverse effect and is making some progress toward goals. Pt appropriately challenged with hip ABD strengthening focus in session today. Pt will continue to benefit from PT to address her LE strength and functional mobility.       Plan:   Review modified side planks   Review bird dogs  As able:   Progressive walking distances- performed to increase walking stamina (SPC vs without device)   Harnessed: walking with 2\" sticks between feet  Cable column walkouts  Stepping over sticks  Stepping over hurdles   Ascending/descending stairs for exercise    Current Problem  1. Ankle stiffness, left        2. Difficulty walking        3. Edema of left ankle          General   Pt states she is tired today due to various household activities. Pt is to see her surgeon 12/11/24. Requesting to hold from progressive walking distances this date due to fatigue.        Subjective    Precautions  Precautions  STEADI Fall Risk Score (The score of 4 or more indicates an increased risk of falling): 9  Vital Signs     Pain  Pain Assessment  Pain Assessment: 0-10  0-10 (Numeric) Pain Score: 0 - No pain    Objective     Treatments:  Therapeutic Exercise (10324): 45 minutes  Modified side plank lift x10 reps with 5 sec holds     Bird dogs: with focus on LE movement, no UE lifting x5 reps with longer hold     Standing slowing marching 2x10 reps, cues for upright positioning and slow movement     Current HEP:   Supine quad set + SLR   Standing hip ABD   Bridges- legs on bolster   LAQ with 3# ankle weight or TB  Resisted HS curls with " blue TB    Mini squats   Lateral & AP/staggered weight shifting  Side stepping with countertop  Access Code: [email or web address]     Goals:  By discharge, pt will meet the following new goals established on 11/13/24:      Pt will increase walking duration in order to complete 6MWT.   Pt will ambulate 350ft independently with minimal gait deviations.   Pt will improve strength of B hip ABD to at least 4/5.   Pt will ascend/descend FOS MOD I without limitation.    n/a

## 2025-05-11 ENCOUNTER — NON-APPOINTMENT (OUTPATIENT)
Age: 75
End: 2025-05-11

## (undated) DEVICE — BIOPSY FORCEP RADIAL JAW 4 STANDARD WITH NEEDLE

## (undated) DEVICE — BIOPSY FORCEP COLD DISP

## (undated) DEVICE — DRSG BANDAID 0.75X3"

## (undated) DEVICE — CONTAINER FORMALIN 80ML YELLOW

## (undated) DEVICE — DENTURE CUP PINK

## (undated) DEVICE — TUBING MEDI-VAC W MAXIGRIP CONNECTORS 1/4"X6'

## (undated) DEVICE — LUBRICATING JELLY HR ONE SHOT 3G

## (undated) DEVICE — UNDERPAD LINEN SAVER 17 X 24"

## (undated) DEVICE — CLAMP BX HOT RAD JAW 3

## (undated) DEVICE — DRSG CURITY GAUZE SPONGE 4 X 4" 12-PLY NON-STERILE

## (undated) DEVICE — TUBING IV SET GRAVITY 3Y 100" MACRO

## (undated) DEVICE — LINE BREATHE SAMPLNG

## (undated) DEVICE — GOWN LG

## (undated) DEVICE — DRSG 2X2

## (undated) DEVICE — ELCTR ECG CONDUCTIVE ADHESIVE

## (undated) DEVICE — PACK IV START WITH CHG

## (undated) DEVICE — CATH IV SAFE BC 22G X 1" (BLUE)

## (undated) DEVICE — FACESHIELD FULL VISOR

## (undated) DEVICE — BITE BLOCK ADULT 20 X 27MM (GREEN)

## (undated) DEVICE — SALIVA EJECTOR (BLUE)

## (undated) DEVICE — BASIN EMESIS 10IN GRADUATED MAUVE